# Patient Record
Sex: FEMALE | Race: WHITE | NOT HISPANIC OR LATINO
[De-identification: names, ages, dates, MRNs, and addresses within clinical notes are randomized per-mention and may not be internally consistent; named-entity substitution may affect disease eponyms.]

---

## 2017-03-03 PROBLEM — Z00.00 ENCOUNTER FOR PREVENTIVE HEALTH EXAMINATION: Status: ACTIVE | Noted: 2017-03-03

## 2017-03-07 ENCOUNTER — APPOINTMENT (OUTPATIENT)
Dept: CARDIOLOGY | Facility: CLINIC | Age: 77
End: 2017-03-07

## 2017-05-04 ENCOUNTER — APPOINTMENT (OUTPATIENT)
Dept: CARDIOLOGY | Facility: CLINIC | Age: 77
End: 2017-05-04

## 2017-06-06 ENCOUNTER — APPOINTMENT (OUTPATIENT)
Dept: ELECTROPHYSIOLOGY | Facility: CLINIC | Age: 77
End: 2017-06-06

## 2017-09-14 ENCOUNTER — APPOINTMENT (OUTPATIENT)
Dept: ELECTROPHYSIOLOGY | Facility: CLINIC | Age: 77
End: 2017-09-14

## 2017-11-09 ENCOUNTER — APPOINTMENT (OUTPATIENT)
Dept: ELECTROPHYSIOLOGY | Facility: CLINIC | Age: 77
End: 2017-11-09
Payer: MEDICARE

## 2017-11-09 VITALS
DIASTOLIC BLOOD PRESSURE: 73 MMHG | WEIGHT: 164 LBS | HEART RATE: 69 BPM | BODY MASS INDEX: 25.74 KG/M2 | HEIGHT: 67 IN | SYSTOLIC BLOOD PRESSURE: 118 MMHG

## 2017-11-09 PROCEDURE — 93280 PM DEVICE PROGR EVAL DUAL: CPT

## 2018-02-13 ENCOUNTER — APPOINTMENT (OUTPATIENT)
Dept: ELECTROPHYSIOLOGY | Facility: CLINIC | Age: 78
End: 2018-02-13
Payer: MEDICARE

## 2018-02-13 PROCEDURE — 93294 REM INTERROG EVL PM/LDLS PM: CPT

## 2018-02-13 PROCEDURE — 93296 REM INTERROG EVL PM/IDS: CPT

## 2018-05-15 ENCOUNTER — APPOINTMENT (OUTPATIENT)
Dept: ELECTROPHYSIOLOGY | Facility: CLINIC | Age: 78
End: 2018-05-15
Payer: MEDICARE

## 2018-05-15 PROCEDURE — 93294 REM INTERROG EVL PM/LDLS PM: CPT

## 2018-05-15 PROCEDURE — 93296 REM INTERROG EVL PM/IDS: CPT

## 2018-05-18 ENCOUNTER — TRANSCRIPTION ENCOUNTER (OUTPATIENT)
Age: 78
End: 2018-05-18

## 2018-08-02 ENCOUNTER — APPOINTMENT (OUTPATIENT)
Dept: CARDIOLOGY | Facility: CLINIC | Age: 78
End: 2018-08-02
Payer: MEDICARE

## 2018-08-02 PROCEDURE — 36416 COLLJ CAPILLARY BLOOD SPEC: CPT

## 2018-08-02 PROCEDURE — 85610 PROTHROMBIN TIME: CPT | Mod: QW

## 2018-08-02 PROCEDURE — 99213 OFFICE O/P EST LOW 20 MIN: CPT

## 2018-08-22 ENCOUNTER — APPOINTMENT (OUTPATIENT)
Dept: CARDIOLOGY | Facility: CLINIC | Age: 78
End: 2018-08-22
Payer: MEDICARE

## 2018-08-22 PROCEDURE — 93793 ANTICOAG MGMT PT WARFARIN: CPT

## 2018-08-22 PROCEDURE — 85610 PROTHROMBIN TIME: CPT | Mod: QW

## 2018-08-28 ENCOUNTER — APPOINTMENT (OUTPATIENT)
Dept: ELECTROPHYSIOLOGY | Facility: CLINIC | Age: 78
End: 2018-08-28
Payer: MEDICARE

## 2018-08-28 PROCEDURE — 93296 REM INTERROG EVL PM/IDS: CPT

## 2018-08-28 PROCEDURE — 93294 REM INTERROG EVL PM/LDLS PM: CPT

## 2018-08-29 ENCOUNTER — APPOINTMENT (OUTPATIENT)
Dept: CARDIOLOGY | Facility: CLINIC | Age: 78
End: 2018-08-29
Payer: MEDICARE

## 2018-08-29 PROCEDURE — 93793 ANTICOAG MGMT PT WARFARIN: CPT

## 2018-08-29 PROCEDURE — 85610 PROTHROMBIN TIME: CPT | Mod: QW

## 2018-09-12 ENCOUNTER — APPOINTMENT (OUTPATIENT)
Dept: CARDIOLOGY | Facility: CLINIC | Age: 78
End: 2018-09-12
Payer: MEDICARE

## 2018-09-12 PROCEDURE — 85610 PROTHROMBIN TIME: CPT | Mod: QW

## 2018-09-12 PROCEDURE — 93793 ANTICOAG MGMT PT WARFARIN: CPT

## 2018-09-19 ENCOUNTER — APPOINTMENT (OUTPATIENT)
Dept: CARDIOLOGY | Facility: CLINIC | Age: 78
End: 2018-09-19
Payer: MEDICARE

## 2018-09-19 PROCEDURE — 85610 PROTHROMBIN TIME: CPT | Mod: QW

## 2018-09-19 PROCEDURE — 93793 ANTICOAG MGMT PT WARFARIN: CPT

## 2018-10-04 ENCOUNTER — APPOINTMENT (OUTPATIENT)
Dept: CARDIOLOGY | Facility: CLINIC | Age: 78
End: 2018-10-04
Payer: MEDICARE

## 2018-10-04 PROCEDURE — 85610 PROTHROMBIN TIME: CPT | Mod: QW

## 2018-10-04 PROCEDURE — 36416 COLLJ CAPILLARY BLOOD SPEC: CPT

## 2018-10-04 PROCEDURE — 99213 OFFICE O/P EST LOW 20 MIN: CPT

## 2018-10-08 ENCOUNTER — APPOINTMENT (OUTPATIENT)
Dept: CARDIOLOGY | Facility: CLINIC | Age: 78
End: 2018-10-08
Payer: MEDICARE

## 2018-10-08 PROCEDURE — 85610 PROTHROMBIN TIME: CPT | Mod: QW

## 2018-10-08 PROCEDURE — 36416 COLLJ CAPILLARY BLOOD SPEC: CPT

## 2018-11-15 ENCOUNTER — APPOINTMENT (OUTPATIENT)
Dept: ELECTROPHYSIOLOGY | Facility: CLINIC | Age: 78
End: 2018-11-15
Payer: MEDICARE

## 2018-11-15 VITALS
HEIGHT: 67 IN | DIASTOLIC BLOOD PRESSURE: 74 MMHG | SYSTOLIC BLOOD PRESSURE: 120 MMHG | BODY MASS INDEX: 23.86 KG/M2 | HEART RATE: 64 BPM | WEIGHT: 152 LBS

## 2018-11-15 PROCEDURE — 93280 PM DEVICE PROGR EVAL DUAL: CPT

## 2018-12-12 ENCOUNTER — APPOINTMENT (OUTPATIENT)
Dept: CARDIOLOGY | Facility: CLINIC | Age: 78
End: 2018-12-12
Payer: MEDICARE

## 2018-12-12 PROCEDURE — 85610 PROTHROMBIN TIME: CPT | Mod: QW

## 2018-12-12 PROCEDURE — 93793 ANTICOAG MGMT PT WARFARIN: CPT

## 2018-12-26 ENCOUNTER — APPOINTMENT (OUTPATIENT)
Dept: CARDIOLOGY | Facility: CLINIC | Age: 78
End: 2018-12-26
Payer: MEDICARE

## 2018-12-26 PROCEDURE — 93793 ANTICOAG MGMT PT WARFARIN: CPT

## 2018-12-26 PROCEDURE — 85610 PROTHROMBIN TIME: CPT | Mod: QW

## 2019-01-02 ENCOUNTER — APPOINTMENT (OUTPATIENT)
Dept: CARDIOLOGY | Facility: CLINIC | Age: 79
End: 2019-01-02
Payer: MEDICARE

## 2019-01-02 PROCEDURE — 85610 PROTHROMBIN TIME: CPT | Mod: QW

## 2019-01-02 PROCEDURE — 93793 ANTICOAG MGMT PT WARFARIN: CPT

## 2019-02-26 ENCOUNTER — APPOINTMENT (OUTPATIENT)
Dept: ELECTROPHYSIOLOGY | Facility: CLINIC | Age: 79
End: 2019-02-26
Payer: MEDICARE

## 2019-02-26 PROCEDURE — 93294 REM INTERROG EVL PM/LDLS PM: CPT

## 2019-02-26 PROCEDURE — 93296 REM INTERROG EVL PM/IDS: CPT

## 2019-02-27 ENCOUNTER — APPOINTMENT (OUTPATIENT)
Dept: CARDIOLOGY | Facility: CLINIC | Age: 79
End: 2019-02-27
Payer: MEDICARE

## 2019-02-27 PROCEDURE — 85610 PROTHROMBIN TIME: CPT | Mod: QW

## 2019-02-27 PROCEDURE — 93793 ANTICOAG MGMT PT WARFARIN: CPT

## 2019-03-19 ENCOUNTER — RX RENEWAL (OUTPATIENT)
Age: 79
End: 2019-03-19

## 2019-04-03 ENCOUNTER — APPOINTMENT (OUTPATIENT)
Dept: CARDIOLOGY | Facility: CLINIC | Age: 79
End: 2019-04-03
Payer: MEDICARE

## 2019-04-03 PROCEDURE — 93793 ANTICOAG MGMT PT WARFARIN: CPT

## 2019-04-03 PROCEDURE — 85610 PROTHROMBIN TIME: CPT | Mod: QW

## 2019-05-15 ENCOUNTER — APPOINTMENT (OUTPATIENT)
Dept: CARDIOLOGY | Facility: CLINIC | Age: 79
End: 2019-05-15
Payer: MEDICARE

## 2019-05-15 PROCEDURE — 93793 ANTICOAG MGMT PT WARFARIN: CPT

## 2019-05-15 PROCEDURE — 85610 PROTHROMBIN TIME: CPT | Mod: QW

## 2019-05-28 ENCOUNTER — APPOINTMENT (OUTPATIENT)
Dept: ELECTROPHYSIOLOGY | Facility: CLINIC | Age: 79
End: 2019-05-28

## 2019-06-12 ENCOUNTER — RX RENEWAL (OUTPATIENT)
Age: 79
End: 2019-06-12

## 2019-06-13 ENCOUNTER — APPOINTMENT (OUTPATIENT)
Dept: CARDIOLOGY | Facility: CLINIC | Age: 79
End: 2019-06-13
Payer: MEDICARE

## 2019-06-13 PROCEDURE — 93793 ANTICOAG MGMT PT WARFARIN: CPT

## 2019-06-13 PROCEDURE — 85610 PROTHROMBIN TIME: CPT | Mod: QW

## 2019-09-03 ENCOUNTER — APPOINTMENT (OUTPATIENT)
Dept: ELECTROPHYSIOLOGY | Facility: CLINIC | Age: 79
End: 2019-09-03
Payer: MEDICARE

## 2019-09-03 PROCEDURE — 93294 REM INTERROG EVL PM/LDLS PM: CPT

## 2019-09-03 PROCEDURE — 93296 REM INTERROG EVL PM/IDS: CPT

## 2019-11-06 LAB — INR PPP: 2.3 RATIO

## 2019-11-13 ENCOUNTER — RESULT REVIEW (OUTPATIENT)
Age: 79
End: 2019-11-13

## 2019-11-14 ENCOUNTER — APPOINTMENT (OUTPATIENT)
Dept: ELECTROPHYSIOLOGY | Facility: CLINIC | Age: 79
End: 2019-11-14
Payer: MEDICARE

## 2019-11-14 VITALS
BODY MASS INDEX: 24.33 KG/M2 | DIASTOLIC BLOOD PRESSURE: 81 MMHG | OXYGEN SATURATION: 99 % | SYSTOLIC BLOOD PRESSURE: 129 MMHG | WEIGHT: 155 LBS | HEIGHT: 67 IN | HEART RATE: 79 BPM

## 2019-11-14 LAB
INR PPP: 2.8 RATIO
QUALITY CONTROL: YES

## 2019-11-14 PROCEDURE — 93280 PM DEVICE PROGR EVAL DUAL: CPT

## 2019-11-14 RX ORDER — DIGOXIN 125 UG/1
125 TABLET ORAL DAILY
Refills: 0 | Status: ACTIVE | COMMUNITY

## 2019-11-14 RX ORDER — WARFARIN SODIUM 5 MG/1
5 TABLET ORAL
Refills: 0 | Status: DISCONTINUED | COMMUNITY
End: 2019-11-14

## 2019-11-14 RX ORDER — ATORVASTATIN CALCIUM 20 MG/1
20 TABLET, FILM COATED ORAL DAILY
Refills: 0 | Status: ACTIVE | COMMUNITY

## 2019-11-14 RX ORDER — FUROSEMIDE 20 MG/1
20 TABLET ORAL
Refills: 0 | Status: ACTIVE | COMMUNITY

## 2019-11-14 RX ORDER — VERAPAMIL HYDROCHLORIDE 120 MG/1
120 CAPSULE, EXTENDED RELEASE ORAL DAILY
Refills: 0 | Status: ACTIVE | COMMUNITY

## 2019-11-14 RX ORDER — SPIRONOLACTONE 25 MG/1
25 TABLET ORAL DAILY
Refills: 0 | Status: ACTIVE | COMMUNITY

## 2019-11-20 ENCOUNTER — APPOINTMENT (OUTPATIENT)
Dept: CARDIOLOGY | Facility: CLINIC | Age: 79
End: 2019-11-20
Payer: MEDICARE

## 2019-11-20 VITALS — RESPIRATION RATE: 12 BRPM | HEART RATE: 87 BPM | SYSTOLIC BLOOD PRESSURE: 133 MMHG | DIASTOLIC BLOOD PRESSURE: 85 MMHG

## 2019-11-20 PROCEDURE — 85610 PROTHROMBIN TIME: CPT | Mod: QW

## 2019-11-20 PROCEDURE — 93793 ANTICOAG MGMT PT WARFARIN: CPT

## 2019-11-25 ENCOUNTER — RX RENEWAL (OUTPATIENT)
Age: 79
End: 2019-11-25

## 2019-11-25 RX ORDER — WARFARIN 5 MG/1
5 TABLET ORAL
Qty: 90 | Refills: 3 | Status: ACTIVE | COMMUNITY
Start: 2019-11-25 | End: 1900-01-01

## 2019-11-27 ENCOUNTER — RESULT REVIEW (OUTPATIENT)
Age: 79
End: 2019-11-27

## 2019-12-04 ENCOUNTER — APPOINTMENT (OUTPATIENT)
Dept: CARDIOLOGY | Facility: CLINIC | Age: 79
End: 2019-12-04

## 2019-12-04 LAB
INR PPP: 2.6 RATIO
QUALITY CONTROL: YES

## 2019-12-06 LAB
INR PPP: 0 RATIO
INR PPP: 2.4 RATIO

## 2019-12-11 LAB
INR PPP: 3 RATIO
POCT-PROTHROMBIN TIME: 35.5 SECS

## 2019-12-12 ENCOUNTER — APPOINTMENT (OUTPATIENT)
Dept: CARDIOLOGY | Facility: CLINIC | Age: 79
End: 2019-12-12
Payer: MEDICARE

## 2019-12-12 PROCEDURE — 93793 ANTICOAG MGMT PT WARFARIN: CPT

## 2019-12-12 PROCEDURE — 85610 PROTHROMBIN TIME: CPT | Mod: QW

## 2019-12-18 ENCOUNTER — RESULT REVIEW (OUTPATIENT)
Age: 79
End: 2019-12-18

## 2019-12-24 ENCOUNTER — RESULT REVIEW (OUTPATIENT)
Age: 79
End: 2019-12-24

## 2019-12-24 LAB
INR PPP: 2.7 RATIO
QUALITY CONTROL: YES

## 2020-01-08 ENCOUNTER — RESULT REVIEW (OUTPATIENT)
Age: 80
End: 2020-01-08

## 2020-01-08 LAB
INR PPP: 2.4 RATIO
QUALITY CONTROL: YES

## 2020-01-09 LAB — INR PPP: 2.4 RATIO

## 2020-01-23 LAB
INR PPP: 2.4 RATIO
QUALITY CONTROL: YES

## 2020-01-25 ENCOUNTER — APPOINTMENT (OUTPATIENT)
Dept: CARDIOLOGY | Facility: CLINIC | Age: 80
End: 2020-01-25
Payer: MEDICARE

## 2020-01-25 PROCEDURE — 85610 PROTHROMBIN TIME: CPT | Mod: QW

## 2020-01-25 PROCEDURE — 93793 ANTICOAG MGMT PT WARFARIN: CPT

## 2020-01-29 ENCOUNTER — APPOINTMENT (OUTPATIENT)
Dept: CARDIOLOGY | Facility: CLINIC | Age: 80
End: 2020-01-29
Payer: MEDICARE

## 2020-01-29 LAB
INR PPP: 1.8 RATIO
INR PPP: 2 RATIO

## 2020-01-29 PROCEDURE — 85610 PROTHROMBIN TIME: CPT | Mod: QW

## 2020-01-29 PROCEDURE — 93793 ANTICOAG MGMT PT WARFARIN: CPT

## 2020-01-31 LAB
INR PPP: 2.1 RATIO
QUALITY CONTROL: YES

## 2020-02-05 ENCOUNTER — APPOINTMENT (OUTPATIENT)
Dept: CARDIOLOGY | Facility: CLINIC | Age: 80
End: 2020-02-05
Payer: MEDICARE

## 2020-02-05 PROCEDURE — 93793 ANTICOAG MGMT PT WARFARIN: CPT

## 2020-02-05 PROCEDURE — 85610 PROTHROMBIN TIME: CPT | Mod: QW

## 2020-02-11 ENCOUNTER — APPOINTMENT (OUTPATIENT)
Dept: ELECTROPHYSIOLOGY | Facility: CLINIC | Age: 80
End: 2020-02-11
Payer: MEDICARE

## 2020-02-11 LAB
INR PPP: 2.6 RATIO
QUALITY CONTROL: YES

## 2020-02-11 PROCEDURE — 93294 REM INTERROG EVL PM/LDLS PM: CPT

## 2020-02-11 PROCEDURE — 93296 REM INTERROG EVL PM/IDS: CPT

## 2020-02-19 ENCOUNTER — APPOINTMENT (OUTPATIENT)
Dept: CARDIOLOGY | Facility: CLINIC | Age: 80
End: 2020-02-19
Payer: MEDICARE

## 2020-02-19 PROCEDURE — 85610 PROTHROMBIN TIME: CPT | Mod: QW

## 2020-02-19 PROCEDURE — 93793 ANTICOAG MGMT PT WARFARIN: CPT

## 2020-02-20 LAB
INR PPP: 3.3 RATIO
QUALITY CONTROL: YES

## 2020-02-26 ENCOUNTER — APPOINTMENT (OUTPATIENT)
Dept: CARDIOLOGY | Facility: CLINIC | Age: 80
End: 2020-02-26
Payer: MEDICARE

## 2020-02-26 LAB
INR PPP: 2.5 RATIO
QUALITY CONTROL: YES

## 2020-02-26 PROCEDURE — 93793 ANTICOAG MGMT PT WARFARIN: CPT

## 2020-02-26 PROCEDURE — 85610 PROTHROMBIN TIME: CPT | Mod: QW

## 2020-02-27 LAB
INR PPP: 3.5 RATIO
QUALITY CONTROL: YES

## 2020-03-04 ENCOUNTER — APPOINTMENT (OUTPATIENT)
Dept: CARDIOLOGY | Facility: CLINIC | Age: 80
End: 2020-03-04
Payer: MEDICARE

## 2020-03-04 PROCEDURE — 93793 ANTICOAG MGMT PT WARFARIN: CPT

## 2020-03-04 PROCEDURE — 36416 COLLJ CAPILLARY BLOOD SPEC: CPT

## 2020-03-04 PROCEDURE — 85610 PROTHROMBIN TIME: CPT | Mod: QW

## 2020-03-09 ENCOUNTER — APPOINTMENT (OUTPATIENT)
Dept: CARDIOLOGY | Facility: CLINIC | Age: 80
End: 2020-03-09
Payer: MEDICARE

## 2020-03-09 PROCEDURE — 93793 ANTICOAG MGMT PT WARFARIN: CPT

## 2020-03-09 PROCEDURE — 85610 PROTHROMBIN TIME: CPT | Mod: QW

## 2020-03-11 ENCOUNTER — APPOINTMENT (OUTPATIENT)
Dept: CARDIOLOGY | Facility: CLINIC | Age: 80
End: 2020-03-11
Payer: MEDICARE

## 2020-03-11 LAB
INR PPP: 3.4 RATIO
QUALITY CONTROL: YES

## 2020-03-11 PROCEDURE — 93793 ANTICOAG MGMT PT WARFARIN: CPT

## 2020-03-16 LAB
INR PPP: 1.4 RATIO
QUALITY CONTROL: YES

## 2020-04-01 ENCOUNTER — APPOINTMENT (OUTPATIENT)
Dept: CARDIOLOGY | Facility: CLINIC | Age: 80
End: 2020-04-01
Payer: MEDICARE

## 2020-04-01 PROCEDURE — 93793 ANTICOAG MGMT PT WARFARIN: CPT

## 2020-04-01 PROCEDURE — 85610 PROTHROMBIN TIME: CPT | Mod: QW

## 2020-04-02 LAB
INR PPP: 2.5 RATIO
INR PPP: 2.6 RATIO
QUALITY CONTROL: YES
QUALITY CONTROL: YES

## 2020-04-23 LAB
INR PPP: 2.9 RATIO
INR PPP: 3 RATIO
QUALITY CONTROL: YES
QUALITY CONTROL: YES

## 2020-05-06 ENCOUNTER — APPOINTMENT (OUTPATIENT)
Dept: CARDIOLOGY | Facility: CLINIC | Age: 80
End: 2020-05-06
Payer: MEDICARE

## 2020-05-06 LAB — INR PPP: 3.3 RATIO

## 2020-05-06 PROCEDURE — 93793 ANTICOAG MGMT PT WARFARIN: CPT

## 2020-05-06 PROCEDURE — 85610 PROTHROMBIN TIME: CPT | Mod: QW

## 2020-05-08 LAB
INR PPP: 2.7 RATIO
INR PPP: 2.8 RATIO
INR PPP: 2.9 RATIO
QUALITY CONTROL: YES

## 2020-05-11 LAB
INR PPP: 2.7 RATIO
QUALITY CONTROL: YES

## 2020-05-18 ENCOUNTER — APPOINTMENT (OUTPATIENT)
Dept: ELECTROPHYSIOLOGY | Facility: CLINIC | Age: 80
End: 2020-05-18
Payer: MEDICARE

## 2020-05-18 PROCEDURE — 93296 REM INTERROG EVL PM/IDS: CPT

## 2020-05-18 PROCEDURE — 93294 REM INTERROG EVL PM/LDLS PM: CPT

## 2020-05-20 LAB
INR PPP: 2.5 RATIO
QUALITY CONTROL: YES

## 2020-05-22 LAB
INR PPP: 2.4 RATIO
QUALITY CONTROL: YES

## 2020-06-03 ENCOUNTER — APPOINTMENT (OUTPATIENT)
Dept: CARDIOLOGY | Facility: CLINIC | Age: 80
End: 2020-06-03
Payer: MEDICARE

## 2020-06-03 PROCEDURE — 85610 PROTHROMBIN TIME: CPT | Mod: QW

## 2020-06-03 PROCEDURE — 93793 ANTICOAG MGMT PT WARFARIN: CPT

## 2020-06-10 LAB
INR PPP: 2.5 RATIO
INR PPP: 2.5 RATIO
QUALITY CONTROL: YES
QUALITY CONTROL: YES

## 2020-06-18 LAB
INR PPP: 2.8 RATIO
QUALITY CONTROL: YES

## 2020-06-30 LAB
INR PPP: 2.3 RATIO
INR PPP: 2.5 RATIO
QUALITY CONTROL: YES
QUALITY CONTROL: YES

## 2020-07-01 ENCOUNTER — APPOINTMENT (OUTPATIENT)
Dept: CARDIOLOGY | Facility: CLINIC | Age: 80
End: 2020-07-01
Payer: MEDICARE

## 2020-07-01 PROCEDURE — 93793 ANTICOAG MGMT PT WARFARIN: CPT

## 2020-07-01 PROCEDURE — 85610 PROTHROMBIN TIME: CPT | Mod: QW

## 2020-07-01 PROCEDURE — 36416 COLLJ CAPILLARY BLOOD SPEC: CPT

## 2020-07-13 LAB
INR PPP: 2.6 RATIO
QUALITY CONTROL: YES

## 2020-07-23 LAB
INR PPP: 2.9 RATIO
INR PPP: 3 RATIO
QUALITY CONTROL: YES
QUALITY CONTROL: YES

## 2020-07-29 LAB
INR PPP: 3.4 RATIO
QUALITY CONTROL: YES

## 2020-08-06 ENCOUNTER — APPOINTMENT (OUTPATIENT)
Dept: CARDIOLOGY | Facility: CLINIC | Age: 80
End: 2020-08-06
Payer: MEDICARE

## 2020-08-06 LAB
INR PPP: 2 RATIO
QUALITY CONTROL: YES

## 2020-08-06 PROCEDURE — 85610 PROTHROMBIN TIME: CPT | Mod: QW

## 2020-08-06 PROCEDURE — 93793 ANTICOAG MGMT PT WARFARIN: CPT

## 2020-08-07 LAB
INR PPP: 3 RATIO
QUALITY CONTROL: YES

## 2020-08-14 LAB
INR PPP: 2.7 RATIO
QUALITY CONTROL: YES

## 2020-08-20 LAB
INR PPP: 2.9 RATIO
QUALITY CONTROL: YES

## 2020-08-24 ENCOUNTER — APPOINTMENT (OUTPATIENT)
Dept: ELECTROPHYSIOLOGY | Facility: CLINIC | Age: 80
End: 2020-08-24
Payer: MEDICARE

## 2020-08-25 PROCEDURE — 93294 REM INTERROG EVL PM/LDLS PM: CPT

## 2020-08-25 PROCEDURE — 93296 REM INTERROG EVL PM/IDS: CPT

## 2020-08-27 LAB
INR PPP: 2.4 RATIO
QUALITY CONTROL: YES

## 2020-09-03 ENCOUNTER — APPOINTMENT (OUTPATIENT)
Dept: CARDIOLOGY | Facility: CLINIC | Age: 80
End: 2020-09-03
Payer: MEDICARE

## 2020-09-03 PROCEDURE — 85610 PROTHROMBIN TIME: CPT | Mod: QW

## 2020-09-03 PROCEDURE — 93793 ANTICOAG MGMT PT WARFARIN: CPT

## 2020-09-04 LAB
INR PPP: 2.8 RATIO
QUALITY CONTROL: YES

## 2020-09-10 ENCOUNTER — APPOINTMENT (OUTPATIENT)
Dept: CARDIOLOGY | Facility: CLINIC | Age: 80
End: 2020-09-10
Payer: MEDICARE

## 2020-09-10 DIAGNOSIS — R79.1 ABNORMAL COAGULATION PROFILE: ICD-10-CM

## 2020-09-10 PROCEDURE — 85610 PROTHROMBIN TIME: CPT | Mod: QW

## 2020-09-10 PROCEDURE — 93793 ANTICOAG MGMT PT WARFARIN: CPT

## 2020-09-16 LAB
INR PPP: 2.7 RATIO
QUALITY CONTROL: YES

## 2020-09-21 LAB
INR PPP: 3.9 RATIO
QUALITY CONTROL: YES

## 2020-09-28 LAB
INR PPP: 2.7 RATIO
QUALITY CONTROL: YES

## 2020-09-30 ENCOUNTER — APPOINTMENT (OUTPATIENT)
Dept: CARDIOLOGY | Facility: CLINIC | Age: 80
End: 2020-09-30
Payer: MEDICARE

## 2020-09-30 VITALS — SYSTOLIC BLOOD PRESSURE: 132 MMHG | RESPIRATION RATE: 18 BRPM | HEART RATE: 81 BPM | DIASTOLIC BLOOD PRESSURE: 76 MMHG

## 2020-09-30 PROCEDURE — 93793 ANTICOAG MGMT PT WARFARIN: CPT

## 2020-09-30 PROCEDURE — 85610 PROTHROMBIN TIME: CPT | Mod: QW

## 2020-09-30 PROCEDURE — 36416 COLLJ CAPILLARY BLOOD SPEC: CPT

## 2020-10-07 ENCOUNTER — APPOINTMENT (OUTPATIENT)
Dept: CARDIOLOGY | Facility: CLINIC | Age: 80
End: 2020-10-07
Payer: MEDICARE

## 2020-10-07 PROCEDURE — 99441: CPT | Mod: 95

## 2020-10-09 LAB
INR PPP: 3 RATIO
POCT-PROTHROMBIN TIME: 36.3 SECS
QUALITY CONTROL: YES

## 2020-10-20 LAB
INR PPP: 2.9 RATIO
QUALITY CONTROL: YES

## 2020-10-21 LAB
INR PPP: 2.7 RATIO
QUALITY CONTROL: YES

## 2020-10-22 LAB
INR PPP: 3.5 RATIO
QUALITY CONTROL: YES

## 2020-10-30 LAB
INR PPP: 2.7 RATIO
QUALITY CONTROL: YES

## 2020-11-04 ENCOUNTER — APPOINTMENT (OUTPATIENT)
Dept: CARDIOLOGY | Facility: CLINIC | Age: 80
End: 2020-11-04
Payer: MEDICARE

## 2020-11-04 PROCEDURE — 93793 ANTICOAG MGMT PT WARFARIN: CPT

## 2020-11-04 PROCEDURE — 85610 PROTHROMBIN TIME: CPT | Mod: QW

## 2020-11-05 LAB
INR PPP: 2.6 RATIO
QUALITY CONTROL: YES

## 2020-11-11 LAB
INR PPP: 3.1 RATIO
QUALITY CONTROL: YES

## 2020-11-18 LAB
INR PPP: 2.9 RATIO
QUALITY CONTROL: YES

## 2020-11-19 ENCOUNTER — APPOINTMENT (OUTPATIENT)
Dept: ELECTROPHYSIOLOGY | Facility: CLINIC | Age: 80
End: 2020-11-19
Payer: MEDICARE

## 2020-11-19 VITALS
HEIGHT: 67 IN | HEART RATE: 81 BPM | OXYGEN SATURATION: 100 % | BODY MASS INDEX: 23.23 KG/M2 | WEIGHT: 148 LBS | DIASTOLIC BLOOD PRESSURE: 62 MMHG | SYSTOLIC BLOOD PRESSURE: 130 MMHG

## 2020-11-19 DIAGNOSIS — I47.2 VENTRICULAR TACHYCARDIA: ICD-10-CM

## 2020-11-19 DIAGNOSIS — I49.5 SICK SINUS SYNDROME: ICD-10-CM

## 2020-11-19 PROCEDURE — 93280 PM DEVICE PROGR EVAL DUAL: CPT

## 2020-11-19 RX ORDER — IRON HEME POLYPEPTIDE/FOLIC AC 12-1MG
125 MCG TABLET ORAL
Refills: 0 | Status: DISCONTINUED | COMMUNITY
End: 2020-11-19

## 2020-11-20 ENCOUNTER — LABORATORY RESULT (OUTPATIENT)
Age: 80
End: 2020-11-20

## 2020-11-25 ENCOUNTER — APPOINTMENT (OUTPATIENT)
Dept: CARDIOLOGY | Facility: CLINIC | Age: 80
End: 2020-11-25
Payer: MEDICARE

## 2020-11-25 PROCEDURE — 99441: CPT | Mod: 95

## 2020-12-02 ENCOUNTER — APPOINTMENT (OUTPATIENT)
Dept: CARDIOLOGY | Facility: CLINIC | Age: 80
End: 2020-12-02
Payer: MEDICARE

## 2020-12-02 PROCEDURE — 99441: CPT | Mod: 95

## 2020-12-03 LAB
INR PPP: 2.4 RATIO
QUALITY CONTROL: YES

## 2020-12-14 LAB — INR PPP: 3.4 RATIO

## 2020-12-21 LAB
INR PPP: 2.6 RATIO
QUALITY CONTROL: YES

## 2020-12-29 LAB
INR PPP: 2.8 RATIO
QUALITY CONTROL: YES

## 2020-12-30 LAB
INR PPP: 3 RATIO
QUALITY CONTROL: YES

## 2021-01-06 ENCOUNTER — APPOINTMENT (OUTPATIENT)
Dept: CARDIOLOGY | Facility: CLINIC | Age: 81
End: 2021-01-06
Payer: MEDICARE

## 2021-01-06 PROCEDURE — 99441: CPT | Mod: 95

## 2021-01-07 LAB
INR PPP: 2.2 RATIO
QUALITY CONTROL: YES

## 2021-01-20 LAB
INR PPP: 2.5 RATIO
QUALITY CONTROL: YES

## 2021-01-21 LAB
INR PPP: 2.5 RATIO
QUALITY CONTROL: YES

## 2021-01-27 LAB
INR PPP: 2.9 RATIO
QUALITY CONTROL: YES

## 2021-02-03 ENCOUNTER — APPOINTMENT (OUTPATIENT)
Dept: CARDIOLOGY | Facility: CLINIC | Age: 81
End: 2021-02-03
Payer: MEDICARE

## 2021-02-03 DIAGNOSIS — M10.9 GOUT, UNSPECIFIED: ICD-10-CM

## 2021-02-03 PROCEDURE — 99441: CPT | Mod: 95

## 2021-02-11 LAB
INR PPP: 3.1 RATIO
QUALITY CONTROL: YES

## 2021-02-17 LAB
INR PPP: 2.5 RATIO
INR PPP: 2.6 RATIO
INR PPP: 2.6 RATIO
QUALITY CONTROL: YES

## 2021-02-24 ENCOUNTER — APPOINTMENT (OUTPATIENT)
Dept: ELECTROPHYSIOLOGY | Facility: CLINIC | Age: 81
End: 2021-02-24
Payer: MEDICARE

## 2021-02-24 LAB
INR PPP: 2.1 RATIO
QUALITY CONTROL: YES

## 2021-02-25 PROCEDURE — 93294 REM INTERROG EVL PM/LDLS PM: CPT

## 2021-02-25 PROCEDURE — 93296 REM INTERROG EVL PM/IDS: CPT

## 2021-02-26 ENCOUNTER — NON-APPOINTMENT (OUTPATIENT)
Age: 81
End: 2021-02-26

## 2021-03-03 ENCOUNTER — APPOINTMENT (OUTPATIENT)
Dept: CARDIOLOGY | Facility: CLINIC | Age: 81
End: 2021-03-03
Payer: MEDICARE

## 2021-03-03 LAB
INR PPP: 2.5 RATIO
QUALITY CONTROL: YES

## 2021-03-03 PROCEDURE — 99441: CPT | Mod: 95

## 2021-03-10 ENCOUNTER — APPOINTMENT (OUTPATIENT)
Dept: CARDIOLOGY | Facility: CLINIC | Age: 81
End: 2021-03-10

## 2021-03-11 LAB
INR PPP: 2.1 RATIO
QUALITY CONTROL: YES

## 2021-03-17 LAB
INR PPP: 2.3 RATIO
QUALITY CONTROL: YES

## 2021-03-23 LAB
INR PPP: 2.3 RATIO
QUALITY CONTROL: YES

## 2021-03-30 ENCOUNTER — APPOINTMENT (OUTPATIENT)
Dept: CARDIOLOGY | Facility: CLINIC | Age: 81
End: 2021-03-30
Payer: MEDICARE

## 2021-03-30 VITALS — DIASTOLIC BLOOD PRESSURE: 72 MMHG | RESPIRATION RATE: 18 BRPM | SYSTOLIC BLOOD PRESSURE: 124 MMHG | HEART RATE: 78 BPM

## 2021-03-30 LAB
INR PPP: 2.2 RATIO
POCT-PROTHROMBIN TIME: 26.9 SECS
QUALITY CONTROL: YES

## 2021-03-30 PROCEDURE — 99213 OFFICE O/P EST LOW 20 MIN: CPT

## 2021-03-30 PROCEDURE — 85610 PROTHROMBIN TIME: CPT | Mod: QW

## 2021-04-07 ENCOUNTER — APPOINTMENT (OUTPATIENT)
Dept: CARDIOLOGY | Facility: CLINIC | Age: 81
End: 2021-04-07
Payer: MEDICARE

## 2021-04-07 LAB
INR PPP: 2.3 RATIO
QUALITY CONTROL: YES

## 2021-04-07 PROCEDURE — 99441: CPT | Mod: 95

## 2021-04-14 LAB
INR PPP: 2.4 RATIO
QUALITY CONTROL: YES

## 2021-04-21 LAB
INR PPP: 2 RATIO
QUALITY CONTROL: YES

## 2021-05-03 LAB
INR PPP: 2.1 RATIO
QUALITY CONTROL: YES

## 2021-05-03 RX ORDER — WARFARIN 5 MG/1
5 TABLET ORAL
Qty: 180 | Refills: 3 | Status: ACTIVE | COMMUNITY
Start: 2019-03-19 | End: 1900-01-01

## 2021-05-05 ENCOUNTER — APPOINTMENT (OUTPATIENT)
Dept: CARDIOLOGY | Facility: CLINIC | Age: 81
End: 2021-05-05
Payer: MEDICARE

## 2021-05-05 LAB
INR PPP: 2.5 RATIO
QUALITY CONTROL: YES

## 2021-05-05 PROCEDURE — 93793 ANTICOAG MGMT PT WARFARIN: CPT

## 2021-05-13 LAB
INR PPP: 2.1 RATIO
QUALITY CONTROL: YES

## 2021-05-21 LAB
INR PPP: 2.2 RATIO
QUALITY CONTROL: YES

## 2021-05-26 ENCOUNTER — APPOINTMENT (OUTPATIENT)
Dept: ELECTROPHYSIOLOGY | Facility: CLINIC | Age: 81
End: 2021-05-26
Payer: MEDICARE

## 2021-05-27 ENCOUNTER — NON-APPOINTMENT (OUTPATIENT)
Age: 81
End: 2021-05-27

## 2021-05-27 LAB
INR PPP: 2.5 RATIO
QUALITY CONTROL: YES

## 2021-05-27 PROCEDURE — 93296 REM INTERROG EVL PM/IDS: CPT

## 2021-05-27 PROCEDURE — 93294 REM INTERROG EVL PM/LDLS PM: CPT

## 2021-06-02 ENCOUNTER — APPOINTMENT (OUTPATIENT)
Dept: CARDIOLOGY | Facility: CLINIC | Age: 81
End: 2021-06-02
Payer: MEDICARE

## 2021-06-02 LAB
INR PPP: 2.3 RATIO
QUALITY CONTROL: YES

## 2021-06-02 PROCEDURE — 93793 ANTICOAG MGMT PT WARFARIN: CPT

## 2021-06-17 LAB
INR PPP: 2.1 RATIO
INR PPP: 2.3 RATIO
QUALITY CONTROL: YES
QUALITY CONTROL: YES

## 2021-06-23 ENCOUNTER — APPOINTMENT (OUTPATIENT)
Dept: CARDIOLOGY | Facility: CLINIC | Age: 81
End: 2021-06-23
Payer: MEDICARE

## 2021-06-23 LAB
INR PPP: 1.8 RATIO
QUALITY CONTROL: YES

## 2021-06-23 PROCEDURE — 93793 ANTICOAG MGMT PT WARFARIN: CPT

## 2021-06-30 LAB
INR PPP: 3 RATIO
QUALITY CONTROL: YES

## 2021-07-07 ENCOUNTER — APPOINTMENT (OUTPATIENT)
Dept: CARDIOLOGY | Facility: CLINIC | Age: 81
End: 2021-07-07
Payer: MEDICARE

## 2021-07-07 PROCEDURE — 93793 ANTICOAG MGMT PT WARFARIN: CPT

## 2021-07-07 PROCEDURE — 85610 PROTHROMBIN TIME: CPT | Mod: QW

## 2021-07-08 LAB
INR PPP: 2.8 RATIO
QUALITY CONTROL: YES

## 2021-07-20 LAB
INR PPP: 3 RATIO
QUALITY CONTROL: YES

## 2021-07-21 LAB
INR PPP: 2.8 RATIO
QUALITY CONTROL: YES

## 2021-08-03 LAB
INR PPP: 2.9 RATIO
QUALITY CONTROL: YES

## 2021-08-05 ENCOUNTER — APPOINTMENT (OUTPATIENT)
Dept: CARDIOLOGY | Facility: CLINIC | Age: 81
End: 2021-08-05
Payer: MEDICARE

## 2021-08-05 PROCEDURE — 85610 PROTHROMBIN TIME: CPT | Mod: QW

## 2021-08-05 PROCEDURE — 93793 ANTICOAG MGMT PT WARFARIN: CPT

## 2021-08-11 LAB
INR PPP: 2.7 RATIO
QUALITY CONTROL: YES

## 2021-08-18 LAB
INR PPP: 2.4 RATIO
INR PPP: 3.2 RATIO
QUALITY CONTROL: YES
QUALITY CONTROL: YES

## 2021-08-25 LAB
INR PPP: 2.1 RATIO
QUALITY CONTROL: YES

## 2021-08-26 ENCOUNTER — APPOINTMENT (OUTPATIENT)
Dept: ELECTROPHYSIOLOGY | Facility: CLINIC | Age: 81
End: 2021-08-26
Payer: MEDICARE

## 2021-08-26 ENCOUNTER — NON-APPOINTMENT (OUTPATIENT)
Age: 81
End: 2021-08-26

## 2021-08-26 PROCEDURE — 93296 REM INTERROG EVL PM/IDS: CPT

## 2021-08-26 PROCEDURE — 93294 REM INTERROG EVL PM/LDLS PM: CPT

## 2021-09-01 ENCOUNTER — APPOINTMENT (OUTPATIENT)
Dept: CARDIOLOGY | Facility: CLINIC | Age: 81
End: 2021-09-01
Payer: MEDICARE

## 2021-09-01 PROCEDURE — 93793 ANTICOAG MGMT PT WARFARIN: CPT

## 2021-09-01 PROCEDURE — 85610 PROTHROMBIN TIME: CPT | Mod: QW

## 2021-09-02 LAB
INR PPP: 2.3 RATIO
QUALITY CONTROL: YES

## 2021-09-08 LAB
INR PPP: 2.3 RATIO
QUALITY CONTROL: YES

## 2021-09-18 LAB
INR PPP: 2.5 RATIO
QUALITY CONTROL: YES

## 2021-09-22 LAB
INR PPP: 2.7 RATIO
QUALITY CONTROL: YES

## 2021-09-29 ENCOUNTER — APPOINTMENT (OUTPATIENT)
Dept: CARDIOLOGY | Facility: CLINIC | Age: 81
End: 2021-09-29
Payer: MEDICARE

## 2021-09-29 VITALS — SYSTOLIC BLOOD PRESSURE: 126 MMHG | HEART RATE: 84 BPM | RESPIRATION RATE: 18 BRPM | DIASTOLIC BLOOD PRESSURE: 79 MMHG

## 2021-09-29 LAB
INR PPP: 3 RATIO
POCT-PROTHROMBIN TIME: 35.9 SECS
QUALITY CONTROL: YES

## 2021-09-29 PROCEDURE — 85610 PROTHROMBIN TIME: CPT | Mod: QW

## 2021-09-29 PROCEDURE — 93793 ANTICOAG MGMT PT WARFARIN: CPT

## 2021-10-06 ENCOUNTER — APPOINTMENT (OUTPATIENT)
Dept: CARDIOLOGY | Facility: CLINIC | Age: 81
End: 2021-10-06
Payer: MEDICARE

## 2021-10-06 ENCOUNTER — NON-APPOINTMENT (OUTPATIENT)
Age: 81
End: 2021-10-06

## 2021-10-06 PROCEDURE — 93793 ANTICOAG MGMT PT WARFARIN: CPT

## 2021-10-07 LAB
INR PPP: 2.6 RATIO
QUALITY CONTROL: YES

## 2021-10-15 LAB
INR PPP: 2.3 RATIO
QUALITY CONTROL: YES

## 2021-10-20 LAB
INR PPP: 2.2 RATIO
QUALITY CONTROL: YES

## 2021-11-03 ENCOUNTER — APPOINTMENT (OUTPATIENT)
Dept: CARDIOLOGY | Facility: CLINIC | Age: 81
End: 2021-11-03
Payer: MEDICARE

## 2021-11-03 PROCEDURE — 93793 ANTICOAG MGMT PT WARFARIN: CPT

## 2021-11-03 PROCEDURE — 85610 PROTHROMBIN TIME: CPT | Mod: QW

## 2021-11-05 LAB
INR PPP: 2.5 RATIO
QUALITY CONTROL: YES

## 2021-11-09 LAB
INR PPP: 2 RATIO
QUALITY CONTROL: YES

## 2021-11-10 LAB
INR PPP: 2.3 RATIO
QUALITY CONTROL: YES

## 2021-11-17 LAB
INR PPP: 2.2 RATIO
QUALITY CONTROL: YES

## 2021-11-18 ENCOUNTER — NON-APPOINTMENT (OUTPATIENT)
Age: 81
End: 2021-11-18

## 2021-11-18 ENCOUNTER — APPOINTMENT (OUTPATIENT)
Dept: ELECTROPHYSIOLOGY | Facility: CLINIC | Age: 81
End: 2021-11-18
Payer: MEDICARE

## 2021-11-18 VITALS
OXYGEN SATURATION: 100 % | WEIGHT: 147 LBS | HEART RATE: 94 BPM | SYSTOLIC BLOOD PRESSURE: 127 MMHG | DIASTOLIC BLOOD PRESSURE: 80 MMHG | HEIGHT: 67 IN | BODY MASS INDEX: 23.07 KG/M2

## 2021-11-18 DIAGNOSIS — Z95.0 PRESENCE OF CARDIAC PACEMAKER: ICD-10-CM

## 2021-11-18 PROCEDURE — 93280 PM DEVICE PROGR EVAL DUAL: CPT

## 2021-11-26 LAB
INR PPP: 2.5 RATIO
QUALITY CONTROL: YES

## 2021-12-01 ENCOUNTER — APPOINTMENT (OUTPATIENT)
Dept: CARDIOLOGY | Facility: CLINIC | Age: 81
End: 2021-12-01
Payer: MEDICARE

## 2021-12-01 LAB
INR PPP: 2.5 RATIO
QUALITY CONTROL: YES

## 2021-12-01 PROCEDURE — 93793 ANTICOAG MGMT PT WARFARIN: CPT

## 2021-12-09 LAB
INR PPP: 2.7 RATIO
QUALITY CONTROL: YES

## 2021-12-15 LAB
INR PPP: 2.6 RATIO
QUALITY CONTROL: YES

## 2021-12-22 LAB
INR PPP: 2.1 RATIO
QUALITY CONTROL: YES

## 2021-12-29 LAB
INR PPP: 2.2 RATIO
QUALITY CONTROL: YES

## 2022-01-05 ENCOUNTER — APPOINTMENT (OUTPATIENT)
Dept: CARDIOLOGY | Facility: CLINIC | Age: 82
End: 2022-01-05
Payer: MEDICARE

## 2022-01-05 LAB
INR PPP: 2.2 RATIO
QUALITY CONTROL: YES

## 2022-01-05 PROCEDURE — 93793 ANTICOAG MGMT PT WARFARIN: CPT

## 2022-01-12 LAB
INR PPP: 2.6 RATIO
QUALITY CONTROL: YES

## 2022-01-19 LAB
INR PPP: 2.2 RATIO
QUALITY CONTROL: YES

## 2022-02-02 ENCOUNTER — APPOINTMENT (OUTPATIENT)
Dept: CARDIOLOGY | Facility: CLINIC | Age: 82
End: 2022-02-02
Payer: MEDICARE

## 2022-02-02 LAB
INR PPP: 2.5 RATIO
INR PPP: 2.6 RATIO
QUALITY CONTROL: YES
QUALITY CONTROL: YES

## 2022-02-02 PROCEDURE — 85610 PROTHROMBIN TIME: CPT | Mod: QW

## 2022-02-02 PROCEDURE — 93793 ANTICOAG MGMT PT WARFARIN: CPT

## 2022-02-14 LAB
INR PPP: 2.7 RATIO
QUALITY CONTROL: YES

## 2022-02-16 ENCOUNTER — APPOINTMENT (OUTPATIENT)
Dept: ELECTROPHYSIOLOGY | Facility: CLINIC | Age: 82
End: 2022-02-16
Payer: MEDICARE

## 2022-02-17 ENCOUNTER — NON-APPOINTMENT (OUTPATIENT)
Age: 82
End: 2022-02-17

## 2022-02-17 PROCEDURE — 93294 REM INTERROG EVL PM/LDLS PM: CPT

## 2022-02-17 PROCEDURE — 93296 REM INTERROG EVL PM/IDS: CPT

## 2022-02-24 LAB
INR PPP: 2.7 RATIO
QUALITY CONTROL: YES

## 2022-02-25 LAB
INR PPP: 2.3 RATIO
QUALITY CONTROL: YES

## 2022-03-02 ENCOUNTER — APPOINTMENT (OUTPATIENT)
Dept: CARDIOLOGY | Facility: CLINIC | Age: 82
End: 2022-03-02
Payer: MEDICARE

## 2022-03-02 PROCEDURE — 93793 ANTICOAG MGMT PT WARFARIN: CPT

## 2022-03-08 LAB
INR PPP: 2.7 RATIO
QUALITY CONTROL: YES

## 2022-03-09 LAB
INR PPP: 2.4 RATIO
QUALITY CONTROL: YES

## 2022-03-23 LAB
INR PPP: 2.4 RATIO
INR PPP: 2.7 RATIO
QUALITY CONTROL: YES
QUALITY CONTROL: YES

## 2022-03-30 LAB
INR PPP: 2.1 RATIO
QUALITY CONTROL: YES

## 2022-04-06 ENCOUNTER — APPOINTMENT (OUTPATIENT)
Dept: CARDIOLOGY | Facility: CLINIC | Age: 82
End: 2022-04-06
Payer: MEDICARE

## 2022-04-06 LAB
INR PPP: 2.1 RATIO
QUALITY CONTROL: YES

## 2022-04-06 PROCEDURE — 93793 ANTICOAG MGMT PT WARFARIN: CPT

## 2022-04-13 ENCOUNTER — APPOINTMENT (OUTPATIENT)
Dept: CARDIOLOGY | Facility: CLINIC | Age: 82
End: 2022-04-13
Payer: MEDICARE

## 2022-04-13 PROCEDURE — 93793 ANTICOAG MGMT PT WARFARIN: CPT

## 2022-04-21 LAB
INR PPP: 2.4 RATIO
QUALITY CONTROL: YES

## 2022-04-27 LAB
INR PPP: 2.4 RATIO
QUALITY CONTROL: YES

## 2022-05-04 ENCOUNTER — APPOINTMENT (OUTPATIENT)
Dept: CARDIOLOGY | Facility: CLINIC | Age: 82
End: 2022-05-04

## 2022-05-05 ENCOUNTER — APPOINTMENT (OUTPATIENT)
Dept: CARDIOLOGY | Facility: CLINIC | Age: 82
End: 2022-05-05
Payer: MEDICARE

## 2022-05-05 PROCEDURE — 93793 ANTICOAG MGMT PT WARFARIN: CPT

## 2022-05-09 ENCOUNTER — FORM ENCOUNTER (OUTPATIENT)
Age: 82
End: 2022-05-09

## 2022-05-09 LAB
INR PPP: 2.4 RATIO
QUALITY CONTROL: YES

## 2022-05-18 ENCOUNTER — APPOINTMENT (OUTPATIENT)
Dept: ELECTROPHYSIOLOGY | Facility: CLINIC | Age: 82
End: 2022-05-18
Payer: MEDICARE

## 2022-05-19 ENCOUNTER — NON-APPOINTMENT (OUTPATIENT)
Age: 82
End: 2022-05-19

## 2022-05-19 ENCOUNTER — APPOINTMENT (OUTPATIENT)
Dept: ELECTROPHYSIOLOGY | Facility: CLINIC | Age: 82
End: 2022-05-19

## 2022-05-19 LAB
INR PPP: 3 RATIO
QUALITY CONTROL: YES

## 2022-05-19 PROCEDURE — 93296 REM INTERROG EVL PM/IDS: CPT

## 2022-05-19 PROCEDURE — 93294 REM INTERROG EVL PM/LDLS PM: CPT

## 2022-05-22 ENCOUNTER — FORM ENCOUNTER (OUTPATIENT)
Age: 82
End: 2022-05-22

## 2022-06-01 LAB
INR PPP: 2.6 RATIO
QUALITY CONTROL: YES

## 2022-07-06 ENCOUNTER — APPOINTMENT (OUTPATIENT)
Dept: CARDIOLOGY | Facility: CLINIC | Age: 82
End: 2022-07-06

## 2022-07-06 LAB
INR PPP: 1.9 RATIO
INR PPP: 2.1 RATIO
INR PPP: 2.5 RATIO
INR PPP: 2.7 RATIO
INR PPP: 2.8 RATIO
QUALITY CONTROL: YES

## 2022-07-06 PROCEDURE — 93793 ANTICOAG MGMT PT WARFARIN: CPT

## 2022-07-13 LAB
INR PPP: 2.2 RATIO
QUALITY CONTROL: YES

## 2022-07-20 LAB
INR PPP: 2.1 RATIO
QUALITY CONTROL: YES

## 2022-08-01 ENCOUNTER — APPOINTMENT (OUTPATIENT)
Dept: CARDIOLOGY | Facility: CLINIC | Age: 82
End: 2022-08-01

## 2022-08-01 LAB
INR PPP: 2.2 RATIO
QUALITY CONTROL: YES

## 2022-08-01 PROCEDURE — 93793 ANTICOAG MGMT PT WARFARIN: CPT

## 2022-08-10 ENCOUNTER — FORM ENCOUNTER (OUTPATIENT)
Age: 82
End: 2022-08-10

## 2022-08-10 LAB
INR PPP: 2.3 RATIO
QUALITY CONTROL: YES

## 2022-08-17 LAB
INR PPP: 2.6 RATIO
QUALITY CONTROL: YES

## 2022-08-18 ENCOUNTER — NON-APPOINTMENT (OUTPATIENT)
Age: 82
End: 2022-08-18

## 2022-08-18 ENCOUNTER — APPOINTMENT (OUTPATIENT)
Dept: ELECTROPHYSIOLOGY | Facility: CLINIC | Age: 82
End: 2022-08-18

## 2022-08-18 PROCEDURE — 93296 REM INTERROG EVL PM/IDS: CPT

## 2022-08-18 PROCEDURE — 93294 REM INTERROG EVL PM/LDLS PM: CPT

## 2022-08-25 LAB
INR PPP: 2.4 RATIO
QUALITY CONTROL: YES

## 2022-09-01 ENCOUNTER — OUTPATIENT (OUTPATIENT)
Dept: OUTPATIENT SERVICES | Facility: HOSPITAL | Age: 82
LOS: 1 days | End: 2022-09-01
Payer: MEDICARE

## 2022-09-01 DIAGNOSIS — Z95.0 PRESENCE OF CARDIAC PACEMAKER: ICD-10-CM

## 2022-09-01 PROCEDURE — 72195 MRI PELVIS W/O DYE: CPT | Mod: 26,MH

## 2022-09-01 PROCEDURE — 71046 X-RAY EXAM CHEST 2 VIEWS: CPT | Mod: 26

## 2022-09-01 PROCEDURE — 72195 MRI PELVIS W/O DYE: CPT | Mod: MH

## 2022-09-01 PROCEDURE — 71046 X-RAY EXAM CHEST 2 VIEWS: CPT

## 2022-09-01 PROCEDURE — 93286 PERI-PX EVAL PM/LDLS PM IP: CPT | Mod: 26,76

## 2022-09-02 DIAGNOSIS — Z95.0 PRESENCE OF CARDIAC PACEMAKER: ICD-10-CM

## 2022-09-07 ENCOUNTER — APPOINTMENT (OUTPATIENT)
Dept: CARDIOLOGY | Facility: CLINIC | Age: 82
End: 2022-09-07

## 2022-09-07 DIAGNOSIS — I50.9 HEART FAILURE, UNSPECIFIED: ICD-10-CM

## 2022-09-07 LAB
INR PPP: 2.8 RATIO
INR PPP: NORMAL RATIO
QUALITY CONTROL: YES
QUALITY CONTROL: YES

## 2022-09-07 PROCEDURE — 93793 ANTICOAG MGMT PT WARFARIN: CPT

## 2022-09-14 DIAGNOSIS — Z71.89 OTHER SPECIFIED COUNSELING: ICD-10-CM

## 2022-09-14 LAB
INR PPP: 2.9 RATIO
QUALITY CONTROL: YES

## 2022-09-21 LAB
INR PPP: 2.8 RATIO
QUALITY CONTROL: YES

## 2022-10-03 LAB
INR PPP: 2.7 RATIO
QUALITY CONTROL: YES

## 2022-10-05 ENCOUNTER — APPOINTMENT (OUTPATIENT)
Dept: CARDIOLOGY | Facility: CLINIC | Age: 82
End: 2022-10-05

## 2022-10-05 DIAGNOSIS — Z79.01 LONG TERM (CURRENT) USE OF ANTICOAGULANTS: ICD-10-CM

## 2022-10-05 LAB
INR PPP: 2.4 RATIO
QUALITY CONTROL: YES

## 2022-10-05 PROCEDURE — 93793 ANTICOAG MGMT PT WARFARIN: CPT

## 2022-10-12 LAB
INR PPP: 2.4 RATIO
QUALITY CONTROL: YES

## 2022-10-19 DIAGNOSIS — I48.20 CHRONIC ATRIAL FIBRILLATION, UNSP: ICD-10-CM

## 2022-10-19 DIAGNOSIS — Z51.81 ENCOUNTER FOR THERAPEUTIC DRUG LVL MONITORING: ICD-10-CM

## 2022-10-19 DIAGNOSIS — Z79.01 ENCOUNTER FOR THERAPEUTIC DRUG LVL MONITORING: ICD-10-CM

## 2022-10-21 LAB
INR PPP: 2.1 RATIO
QUALITY CONTROL: YES

## 2022-11-02 ENCOUNTER — APPOINTMENT (OUTPATIENT)
Dept: ORTHOPEDIC SURGERY | Facility: CLINIC | Age: 82
End: 2022-11-02

## 2022-11-18 ENCOUNTER — APPOINTMENT (OUTPATIENT)
Dept: ELECTROPHYSIOLOGY | Facility: CLINIC | Age: 82
End: 2022-11-18

## 2022-11-21 ENCOUNTER — APPOINTMENT (OUTPATIENT)
Dept: ELECTROPHYSIOLOGY | Facility: CLINIC | Age: 82
End: 2022-11-21

## 2022-11-22 ENCOUNTER — NON-APPOINTMENT (OUTPATIENT)
Age: 82
End: 2022-11-22

## 2022-11-22 PROCEDURE — 93296 REM INTERROG EVL PM/IDS: CPT

## 2022-11-22 PROCEDURE — 93294 REM INTERROG EVL PM/LDLS PM: CPT

## 2023-01-26 ENCOUNTER — EMERGENCY (EMERGENCY)
Facility: HOSPITAL | Age: 83
LOS: 0 days | Discharge: HOME | End: 2023-01-26
Attending: EMERGENCY MEDICINE
Payer: MEDICARE

## 2023-01-26 VITALS
DIASTOLIC BLOOD PRESSURE: 73 MMHG | HEART RATE: 82 BPM | OXYGEN SATURATION: 100 % | SYSTOLIC BLOOD PRESSURE: 115 MMHG | TEMPERATURE: 98 F | RESPIRATION RATE: 16 BRPM

## 2023-01-26 VITALS — WEIGHT: 149.91 LBS

## 2023-01-26 DIAGNOSIS — Z88.1 ALLERGY STATUS TO OTHER ANTIBIOTIC AGENTS STATUS: ICD-10-CM

## 2023-01-26 DIAGNOSIS — Z79.01 LONG TERM (CURRENT) USE OF ANTICOAGULANTS: ICD-10-CM

## 2023-01-26 DIAGNOSIS — M79.89 OTHER SPECIFIED SOFT TISSUE DISORDERS: ICD-10-CM

## 2023-01-26 DIAGNOSIS — Z96.641 PRESENCE OF RIGHT ARTIFICIAL HIP JOINT: ICD-10-CM

## 2023-01-26 DIAGNOSIS — Z95.0 PRESENCE OF CARDIAC PACEMAKER: ICD-10-CM

## 2023-01-26 DIAGNOSIS — R60.0 LOCALIZED EDEMA: ICD-10-CM

## 2023-01-26 DIAGNOSIS — M79.661 PAIN IN RIGHT LOWER LEG: ICD-10-CM

## 2023-01-26 DIAGNOSIS — J44.9 CHRONIC OBSTRUCTIVE PULMONARY DISEASE, UNSPECIFIED: ICD-10-CM

## 2023-01-26 DIAGNOSIS — F17.200 NICOTINE DEPENDENCE, UNSPECIFIED, UNCOMPLICATED: ICD-10-CM

## 2023-01-26 DIAGNOSIS — I50.9 HEART FAILURE, UNSPECIFIED: ICD-10-CM

## 2023-01-26 DIAGNOSIS — I48.91 UNSPECIFIED ATRIAL FIBRILLATION: ICD-10-CM

## 2023-01-26 DIAGNOSIS — Z86.73 PERSONAL HISTORY OF TRANSIENT ISCHEMIC ATTACK (TIA), AND CEREBRAL INFARCTION WITHOUT RESIDUAL DEFICITS: ICD-10-CM

## 2023-01-26 LAB
ALBUMIN SERPL ELPH-MCNC: 3.8 G/DL — SIGNIFICANT CHANGE UP (ref 3.3–5)
ALP SERPL-CCNC: 154 U/L — HIGH (ref 40–120)
ALT FLD-CCNC: 42 U/L — SIGNIFICANT CHANGE UP (ref 12–78)
ANION GAP SERPL CALC-SCNC: 4 MMOL/L — LOW (ref 5–17)
AST SERPL-CCNC: 29 U/L — SIGNIFICANT CHANGE UP (ref 15–37)
BASOPHILS # BLD AUTO: 0.04 K/UL — SIGNIFICANT CHANGE UP (ref 0–0.2)
BASOPHILS NFR BLD AUTO: 0.4 % — SIGNIFICANT CHANGE UP (ref 0–2)
BILIRUB SERPL-MCNC: 0.9 MG/DL — SIGNIFICANT CHANGE UP (ref 0.2–1.2)
BLD GP AB SCN SERPL QL: SIGNIFICANT CHANGE UP
BUN SERPL-MCNC: 23 MG/DL — SIGNIFICANT CHANGE UP (ref 7–23)
CALCIUM SERPL-MCNC: 10.4 MG/DL — HIGH (ref 8.5–10.1)
CHLORIDE SERPL-SCNC: 102 MMOL/L — SIGNIFICANT CHANGE UP (ref 96–108)
CO2 SERPL-SCNC: 25 MMOL/L — SIGNIFICANT CHANGE UP (ref 22–31)
CREAT SERPL-MCNC: 1.13 MG/DL — SIGNIFICANT CHANGE UP (ref 0.5–1.3)
EGFR: 49 ML/MIN/1.73M2 — LOW
EOSINOPHIL # BLD AUTO: 0.07 K/UL — SIGNIFICANT CHANGE UP (ref 0–0.5)
EOSINOPHIL NFR BLD AUTO: 0.8 % — SIGNIFICANT CHANGE UP (ref 0–6)
GLUCOSE SERPL-MCNC: 105 MG/DL — HIGH (ref 70–99)
HCT VFR BLD CALC: 35.9 % — SIGNIFICANT CHANGE UP (ref 34.5–45)
HGB BLD-MCNC: 11.6 G/DL — SIGNIFICANT CHANGE UP (ref 11.5–15.5)
IMM GRANULOCYTES NFR BLD AUTO: 0.3 % — SIGNIFICANT CHANGE UP (ref 0–0.9)
LYMPHOCYTES # BLD AUTO: 1.42 K/UL — SIGNIFICANT CHANGE UP (ref 1–3.3)
LYMPHOCYTES # BLD AUTO: 15.4 % — SIGNIFICANT CHANGE UP (ref 13–44)
MCHC RBC-ENTMCNC: 31.2 PG — SIGNIFICANT CHANGE UP (ref 27–34)
MCHC RBC-ENTMCNC: 32.3 GM/DL — SIGNIFICANT CHANGE UP (ref 32–36)
MCV RBC AUTO: 96.5 FL — SIGNIFICANT CHANGE UP (ref 80–100)
MONOCYTES # BLD AUTO: 0.62 K/UL — SIGNIFICANT CHANGE UP (ref 0–0.9)
MONOCYTES NFR BLD AUTO: 6.7 % — SIGNIFICANT CHANGE UP (ref 2–14)
NEUTROPHILS # BLD AUTO: 7.03 K/UL — SIGNIFICANT CHANGE UP (ref 1.8–7.4)
NEUTROPHILS NFR BLD AUTO: 76.4 % — SIGNIFICANT CHANGE UP (ref 43–77)
PLATELET # BLD AUTO: 302 K/UL — SIGNIFICANT CHANGE UP (ref 150–400)
POTASSIUM SERPL-MCNC: 4.1 MMOL/L — SIGNIFICANT CHANGE UP (ref 3.5–5.3)
POTASSIUM SERPL-SCNC: 4.1 MMOL/L — SIGNIFICANT CHANGE UP (ref 3.5–5.3)
PROT SERPL-MCNC: 7.2 GM/DL — SIGNIFICANT CHANGE UP (ref 6–8.3)
RBC # BLD: 3.72 M/UL — LOW (ref 3.8–5.2)
RBC # FLD: 14.4 % — SIGNIFICANT CHANGE UP (ref 10.3–14.5)
SODIUM SERPL-SCNC: 131 MMOL/L — LOW (ref 135–145)
WBC # BLD: 9.21 K/UL — SIGNIFICANT CHANGE UP (ref 3.8–10.5)
WBC # FLD AUTO: 9.21 K/UL — SIGNIFICANT CHANGE UP (ref 3.8–10.5)

## 2023-01-26 PROCEDURE — 99284 EMERGENCY DEPT VISIT MOD MDM: CPT | Mod: FS

## 2023-01-26 PROCEDURE — 99284 EMERGENCY DEPT VISIT MOD MDM: CPT | Mod: 25

## 2023-01-26 PROCEDURE — 86900 BLOOD TYPING SEROLOGIC ABO: CPT

## 2023-01-26 PROCEDURE — 85025 COMPLETE CBC W/AUTO DIFF WBC: CPT

## 2023-01-26 PROCEDURE — 86901 BLOOD TYPING SEROLOGIC RH(D): CPT

## 2023-01-26 PROCEDURE — 93971 EXTREMITY STUDY: CPT | Mod: 26,RT

## 2023-01-26 PROCEDURE — 86850 RBC ANTIBODY SCREEN: CPT

## 2023-01-26 PROCEDURE — 36415 COLL VENOUS BLD VENIPUNCTURE: CPT

## 2023-01-26 PROCEDURE — 80053 COMPREHEN METABOLIC PANEL: CPT

## 2023-01-26 PROCEDURE — 93971 EXTREMITY STUDY: CPT | Mod: RT

## 2023-01-26 NOTE — ED ADULT NURSE NOTE - OBJECTIVE STATEMENT
pt presents to the ED for worsening right leg swelling and burning . pt had right hip replacement 2 weeks ago. denies cp, sob. No other complaints at this time.

## 2023-01-26 NOTE — ED STATDOCS - PHYSICAL EXAMINATION
Constitutional: NAD AAOx3  Eyes: PERRLA EOMI  Head: Normocephalic atraumatic  Mouth: MMM  Cardiac: regular rate   Resp: Lungs CTAB  GI: Abd s/nt/nd, no rebound or guarding.  MSK: RLE with 1+ pitting edema, NVI, chronic venous stasis  Neuro: awake, alert, moving all extremities, cranial nerves 2-12 intact, sensation intact, no dysmetria.  Skin: No rashes

## 2023-01-26 NOTE — ED STATDOCS - PATIENT PORTAL LINK FT
You can access the FollowMyHealth Patient Portal offered by [facility] by registering at the following website: [email or web address]. By joining [facility]’s FollowMyHealth portal, you will also be able to view your health information using other applications (apps) compatible with our system.

## 2023-01-26 NOTE — ED ADULT NURSE NOTE - CHIEF COMPLAINT QUOTE
Glioblastoma sp R TKR 2 weeks ago, p/w R leg swelling, erythema and pain. pt. is on Eliquis since sx.

## 2023-01-26 NOTE — ED STATDOCS - NSFOLLOWUPINSTRUCTIONS_ED_ALL_ED_FT
Follow up with your surgeon for further evaluation.   Elevate the leg.  Use compression stockings    Return to the ER for any new or other concerns.           Peripheral Edema    A person's legs and feet. One leg is normal and the other leg is affected by edema.   Peripheral edema is swelling that is caused by a buildup of fluid. Peripheral edema most often affects the lower legs, ankles, and feet. It can also develop in the arms, hands, and face. The area of the body that has peripheral edema will look swollen. It may also feel heavy or warm. Your clothes may start to feel tight. Pressing on the area may make a temporary dent in your skin (pitting edema). You may not be able to move your swollen arm or leg as much as usual.    There are many causes of peripheral edema. It can happen because of a complication of other conditions such as heart failure, kidney disease, or a problem with your circulation. It also can be a side effect of certain medicines or happen because of an infection. It often happens to women during pregnancy. Sometimes, the cause is not known.      Follow these instructions at home:      Managing pain, stiffness, and swelling   Compression stockings on a person's lower legs.   •Raise (elevate) your legs while you are sitting or lying down.      •Move around often to prevent stiffness and to reduce swelling.      • Do not sit or stand for long periods of time.      • Do not wear tight clothing. Do not wear garters on your upper legs.      •Exercise your legs to get your circulation going. This helps to move the fluid back into your blood vessels, and it may help the swelling go down.      •Wear compression stockings as told by your health care provider. These stockings help to prevent blood clots and reduce swelling in your legs. It is important that these are the correct size. These stockings should be prescribed by your doctor to prevent possible injuries.      •If elastic bandages or wraps are recommended, use them as told by your health care provider.      Medicines     •Take over-the-counter and prescription medicines only as told by your health care provider.      •Your health care provider may prescribe medicine to help your body get rid of excess water (diuretic). Take this medicine if you are told to take it.      General instructions     •Eat a low-salt (low-sodium) diet as told by your health care provider. Sometimes, eating less salt may reduce swelling.      •Pay attention to any changes in your symptoms.      •Moisturize your skin daily to help prevent skin from cracking and draining.      •Keep all follow-up visits. This is important.        Contact a health care provider if:    •You have a fever.      •You have swelling in only one leg.      •You have increased swelling, redness, or pain in one or both of your legs.      •You have drainage or sores at the area where you have edema.        Get help right away if:    •You have edema that starts suddenly or is getting worse, especially if you are pregnant or have a medical condition.      •You develop shortness of breath, especially when you are lying down.      •You have pain in your chest or abdomen.      •You feel weak.      •You feel like you will faint.      These symptoms may be an emergency. Get help right away. Call 911.   • Do not wait to see if the symptoms will go away.       • Do not drive yourself to the hospital.         Summary    •Peripheral edema is swelling that is caused by a buildup of fluid. Peripheral edema most often affects the lower legs, ankles, and feet.      •Move around often to prevent stiffness and to reduce swelling. Do not sit or stand for long periods of time.      •Pay attention to any changes in your symptoms.      •Contact a health care provider if you have edema that starts suddenly or is getting worse, especially if you are pregnant or have a medical condition.      •Get help right away if you develop shortness of breath, especially when lying down.

## 2023-01-26 NOTE — ED STATDOCS - NS ED ATTENDING STATEMENT MOD
This was a shared visit with the CHRISTY. I reviewed and verified the documentation and independently performed the documented:

## 2023-01-26 NOTE — ED ADULT NURSE NOTE - NSFALLRSKASSESSDT_ED_ALL_ED
From: Anabel Rodriguez  To: Valerie May MD  Sent: 6/6/2017 1:08 PM CDT  Subject: anxiety medicine    [facility], I just saw Dr. May not that long ago to discuss my new anxiety medicine. It seem to be doing the job until lately.  I am feeling anxious, irritable and agitated. I am wondering if the old medicine is finally out of my system and only getting the new medicine in my system. Can i take 2 pills or should I get a different dosage prescribed to me.  Thank you,  Lesa Rodriguez   26-Jan-2023 15:34

## 2023-01-26 NOTE — ED STATDOCS - OBJECTIVE STATEMENT
83 y/o female with PMHx of Afib, stroke, CHF, COPD, pacemaker presents to the ED c/o R leg swelling, redness and pain s/p R hip replacement that was done two weeks ago. Pt on Eliquis and water pills. Denies chest pain and SOB. States her R leg is warm to the touch. Pt is not a smoker.

## 2023-01-26 NOTE — ED STATDOCS - CLINICAL SUMMARY MEDICAL DECISION MAKING FREE TEXT BOX
Elderly female presents with right leg swelling s/p R hip replacement. Will r/o DVT, cellulitis and reassess closely.

## 2023-01-26 NOTE — ED STATDOCS - DISCHARGE DATE
OFFICE VISIT      Patient: Adriel Kerns   : 1953 MRN: [id number]    SUBJECTIVE:  Chief Complaint   Patient presents with   • Ear Problem     right ear buzzing x 3 days   • Foot     left foot step on nail went 3 days ago   • Knee Pain     left inner aspect acute x 1 month       A 68 year old male is here for multiple issues.    Patient has given consent to record this visit for documentation in their clinical record.    HISTORY OF PRESENT ILLNESS:    Acute dysfunction of right eustachian tube; Impacted cerumen of right ear:  Reports difficulty hearing after he heard a popping in his tight ear during a bowel movement 3-4 days ago. No discharge or bleeding through ear. Has buzzing in the right ear.   Has sinus infections which are intermittent. Denies taking any medications.     Need for vaccination:  Due for Tetanus vaccine.     Puncture wound of lesser toe of right foot without foreign body without damage to nail, initial encounter:  Reports wound on the right foot after stepping on the nail. He had stepped on a nail with shoes on his right foot 3 days ago. The nail was not too long and just touched the foot through the shoes. No tenderness on the spot where the nail hit. He applied band aid on it, but it did not stay.  The nail was clean and no rust.       MEDICATIONS:  Current Outpatient Medications   Medication Sig   • metformin (GLUCOPHAGE) 1000 MG tablet TAKE 1 TABLET BY MOUTH  TWICE DAILY WITH MEALS   • pioglitazone (ACTOS) 30 MG tablet TAKE 1 TABLET BY MOUTH  DAILY   • hydrochlorothiazide (HYDRODIURIL) 25 MG tablet TAKE 1 TABLET BY MOUTH  DAILY   • lisinopril (ZESTRIL) 5 MG tablet TAKE 1 TABLET BY MOUTH  DAILY   • pantoprazole (PROTONIX) 40 MG tablet TAKE 1 TABLET BY MOUTH  DAILY   • albuterol 108 (90 Base) MCG/ACT inhaler Inhale 2 puffs into the lungs as needed for Shortness of Breath or Wheezing.   • diclofenac (VOLTAREN) 75 MG EC tablet Take 1 tablet by mouth 2 times daily.   • gabapentin  (NEURONTIN) 800 MG tablet TAKE 1 TABLET BY MOUTH 3  TIMES DAILY ALONG WITH  100MG CAPSULE FOR A TOTAL   MG THREE TIMES DAILY   • tamsulosin (FLOMAX) 0.4 MG Cap TAKE 2 CAPSULES BY MOUTH  DAILY AFTER A MEAL   • glimepiride (AMARYL) 4 MG tablet TAKE ONE-HALF TABLET BY  MOUTH IN THE MORNING AND 1  TABLET IN THE EVENING   • atorvastatin (LIPITOR) 20 MG tablet TAKE 1 TABLET BY MOUTH  DAILY FOR CHOLESTEROL   • gabapentin (NEURONTIN) 100 MG capsule TAKE 1 CAPSULE BY MOUTH 3  TIMES DAILY   • fluticasone (FLONASE) 50 MCG/ACT nasal spray as needed.   • ketotifen (ZADITOR) 0.025 % ophthalmic solution Place into both eyes daily.   • naproxen sodium (ALEVE) 220 MG tablet 220 mg as needed.   • diclofenac (VOLTAREN) 1 % gel Apply 4 gr to joint 4 times daily Max 32 gr/day   • Cinnamon 500 MG Cap Take by mouth daily.    • latanoprost (XALATAN) 0.005 % ophthalmic solution INT ONE GTT IN BOTH EYES QPM. REFRIGERATE SPARE BOTTLES   • blood glucose (ONE TOUCH ULTRA TEST) test strip Testing once daily for  Diabetes E11.9   • Multiple Vitamins-Minerals (MULTIVITAMIN ADULT PO) Take by mouth daily.   • Pyridoxine HCl (VITAMIN B-6) 100 MG tablet Take 100 mg by mouth daily.   • cyanocobalamin (VITAMIN B-12) 100 MCG tablet Take 50 mcg by mouth daily.   • lidocaine-prilocaine (EMLA) cream Apply generous amount over port 1 hour before use.   • loratadine (CLARITIN) 10 MG tablet Take 1 tablet by mouth daily.     No current facility-administered medications for this visit.     ALLERGIES:  ALLERGIES:  No Known Allergies      Review Of Systems:  Constitutional: Per HPI.  HENT: Per HPI.  Musculoskeletal: Per HPI.  Skin: Per HPI.        OBJECTIVE:  Vitals:    12/24/21 1004   BP: 110/70   Pulse: 68   Weight: (!) 147.9 kg (326 lb 1.6 oz)   Height: 6' 1\" (1.854 m)       Body mass index is 43.02 kg/m².    Physical Exam    Constitutional: All vital signs are stable. Pleasant, alert, oriented to person, place, time, and date. Appears stated age. No  apparent distress. Well developed, well nourished, well groomed.  Nares: Thin clear mucus. Sinuses runny and congested.   Ears: Cerumen impaction in right ear. Mild effusion posterior in right ear after cerumen removal with Q-tip. Normal left ear.   Oropharynx: Clear without oropharyngeal lesions. Good dentition. Tongue midline. No perioral edema.  Neck: Supple. No bruits, JVD, tenderness, lymphadenopathy, or thyromegaly. Normal range of motion.  Lymphatic: No cervical adenopathy.  Respiratory/Lungs: Clear to auscultation throughout. Good air exchange. No wheezing, rales, or rhonchi.  Heart: Normal S1, S2. Regular rate and rhythm. No murmur, rubs, or gallops.    ASSESSMENT AND PLAN:  This is a 68 year old male who presents with :  1. Acute dysfunction of right eustachian tube    2. Need for vaccination    3. Impacted cerumen of right ear    4. Puncture wound of lesser toe of right foot without foreign body without damage to nail, initial encounter        Orders Placed This Encounter   • Td Toxoid Adsorbed Pres Free Vacc, 7+ yrs (DECAVAC/TENIVAC)   • loratadine (CLARITIN) 10 MG tablet         Plan:    Acute dysfunction of right eustachian tube:  Prescribed Claritin 10 mg to be taken as directed; mode of action, benefits and side effects explained.  Informed it may take a couple of weeks for effusion repair.   Will refer to audiologist if hearing is not improved.     Need for vaccination;  Administered TD Toxoid vaccine in the office today.     Impacted cerumen of right ear:  Cerumen was successfully disimpacted from right ear with Q-tip  Patient tolerated the procedure well.    Puncture wound of lesser toe of right foot without foreign body without damage to nail, initial encounter:  Continue to monitor the foot daily with a mirror.   Advised to inform if has erythema or edema. Advised to visit the ER.      Refer to orders.  Medical compliance with plan discussed and risks of non-compliance reviewed.  Patient  education completed on disease process, etiology & prognosis.  Proper usage and side effects of medications reviewed & discussed.  Patient understands and agrees with the plan.  Return to clinic as clinically indicated as discussed with patient who verbalized understanding of the plan and is in agreement with the plan.    I,  Dr. Nelly Berry, have created a visit summary document based on the audio recording between Dr. Rivera Holden DO and this patient for the physician to review, edit as needed, and authenticate.  Creation Date: 12/27/2021   I have reviewed and edited the visit summary above and attest that it is accurate.     26-Jan-2023

## 2023-01-26 NOTE — ED STATDOCS - ATTENDING APP SHARED VISIT CONTRIBUTION OF CARE
I, Taylor Contreras MD, performed the initial face to face bedside interview with this patient regarding history of present illness, review of symptoms and relevant past medical, social and family history.  I completed an independent physical examination.  I was the initial provider who evaluated this patient. I have signed out the follow up of any pending tests (i.e. labs, radiological studies) to the ACP.  I have communicated the patient’s plan of care and disposition with the ACP.  The history, relevant review of systems, past medical and surgical history, medical decision making, and physical examination was documented by the scribe in my presence and I attest to the accuracy of the documentation.

## 2023-01-26 NOTE — ED STATDOCS - NS ED ROS FT
Constitutional: No fever or chills  Eyes: No visual changes  HEENT: No throat pain  CV: No chest pain  Resp: No SOB no cough  GI: No abd pain, nausea or vomiting  : No dysuria  MSK: No musculoskeletal pain, +R leg swelling, +R leg erythematous  Skin: No rash  Neuro: No headache

## 2023-01-26 NOTE — ED STATDOCS - PROGRESS NOTE DETAILS
81 yo female with a PMH of a fib on eliquis, PPM, copd, chf, recent RHR sx at [facility] on 1/11/23 presents with RLE swelling and redness. Pt noticed it after the surgery which progressively got worse despite restarting her eliquis and taking her 2 water pills for chf. The PT that comes in for her noticed the swelling and informed her to go to the ER. Pt went to urgent care instead and they referred her to the ER. Will obtain labs, sono and reeval. -Jose Meza PA-C Labs and sono unremarkable. Discussed with pt. Advised to elevate leg and use compression stocking for the RLE to help with swelling. Pt aware and will call his surgeon to f/u. -Jose Meza PA-C

## 2023-01-26 NOTE — ED ADULT NURSE NOTE - NSIMPLEMENTINTERV_GEN_ALL_ED
Implemented All Fall Risk Interventions:  [place] to call system. Call bell, personal items and telephone within reach. Instruct patient to call for assistance. Room bathroom lighting operational. Non-slip footwear when patient is off stretcher. Physically safe environment: no spills, clutter or unnecessary equipment. Stretcher in lowest position, wheels locked, appropriate side rails in place. Provide visual cue, wrist band, yellow gown, etc. Monitor gait and stability. Monitor for mental status changes and reorient to person, place, and time. Review medications for side effects contributing to fall risk. Reinforce activity limits and safety measures with patient and family.

## 2023-02-14 ENCOUNTER — FORM ENCOUNTER (OUTPATIENT)
Age: 83
End: 2023-02-14

## 2023-02-21 ENCOUNTER — APPOINTMENT (OUTPATIENT)
Dept: ELECTROPHYSIOLOGY | Facility: CLINIC | Age: 83
End: 2023-02-21
Payer: MEDICARE

## 2023-02-22 ENCOUNTER — NON-APPOINTMENT (OUTPATIENT)
Age: 83
End: 2023-02-22

## 2023-02-22 PROCEDURE — 93294 REM INTERROG EVL PM/LDLS PM: CPT

## 2023-02-22 PROCEDURE — 93296 REM INTERROG EVL PM/IDS: CPT

## 2023-04-25 ENCOUNTER — FORM ENCOUNTER (OUTPATIENT)
Age: 83
End: 2023-04-25

## 2023-05-23 ENCOUNTER — APPOINTMENT (OUTPATIENT)
Dept: ELECTROPHYSIOLOGY | Facility: CLINIC | Age: 83
End: 2023-05-23
Payer: MEDICARE

## 2023-05-24 ENCOUNTER — NON-APPOINTMENT (OUTPATIENT)
Age: 83
End: 2023-05-24

## 2023-05-24 PROCEDURE — 93296 REM INTERROG EVL PM/IDS: CPT

## 2023-05-24 PROCEDURE — 93294 REM INTERROG EVL PM/LDLS PM: CPT

## 2023-08-22 ENCOUNTER — APPOINTMENT (OUTPATIENT)
Dept: ELECTROPHYSIOLOGY | Facility: CLINIC | Age: 83
End: 2023-08-22
Payer: MEDICARE

## 2023-08-23 ENCOUNTER — NON-APPOINTMENT (OUTPATIENT)
Age: 83
End: 2023-08-23

## 2023-08-23 PROCEDURE — 93296 REM INTERROG EVL PM/IDS: CPT

## 2023-08-23 PROCEDURE — 93294 REM INTERROG EVL PM/LDLS PM: CPT

## 2023-11-21 ENCOUNTER — APPOINTMENT (OUTPATIENT)
Dept: ELECTROPHYSIOLOGY | Facility: CLINIC | Age: 83
End: 2023-11-21
Payer: MEDICARE

## 2023-11-22 ENCOUNTER — NON-APPOINTMENT (OUTPATIENT)
Age: 83
End: 2023-11-22

## 2023-11-22 PROCEDURE — 93296 REM INTERROG EVL PM/IDS: CPT

## 2023-11-22 PROCEDURE — 93294 REM INTERROG EVL PM/LDLS PM: CPT

## 2024-02-21 ENCOUNTER — APPOINTMENT (OUTPATIENT)
Dept: ELECTROPHYSIOLOGY | Facility: CLINIC | Age: 84
End: 2024-02-21
Payer: MEDICARE

## 2024-02-21 ENCOUNTER — NON-APPOINTMENT (OUTPATIENT)
Age: 84
End: 2024-02-21

## 2024-02-21 PROCEDURE — 93296 REM INTERROG EVL PM/IDS: CPT

## 2024-02-21 PROCEDURE — 93294 REM INTERROG EVL PM/LDLS PM: CPT

## 2024-05-22 ENCOUNTER — NON-APPOINTMENT (OUTPATIENT)
Age: 84
End: 2024-05-22

## 2024-05-22 ENCOUNTER — APPOINTMENT (OUTPATIENT)
Dept: ELECTROPHYSIOLOGY | Facility: CLINIC | Age: 84
End: 2024-05-22
Payer: MEDICARE

## 2024-05-22 PROCEDURE — 93294 REM INTERROG EVL PM/LDLS PM: CPT

## 2024-05-22 PROCEDURE — 93296 REM INTERROG EVL PM/IDS: CPT

## 2024-08-20 ENCOUNTER — APPOINTMENT (OUTPATIENT)
Dept: ELECTROPHYSIOLOGY | Facility: CLINIC | Age: 84
End: 2024-08-20
Payer: MEDICARE

## 2024-08-21 ENCOUNTER — NON-APPOINTMENT (OUTPATIENT)
Age: 84
End: 2024-08-21

## 2024-08-21 PROCEDURE — 93294 REM INTERROG EVL PM/LDLS PM: CPT

## 2024-08-21 PROCEDURE — 93296 REM INTERROG EVL PM/IDS: CPT

## 2024-08-21 RX ORDER — APIXABAN 5 MG/1
5 TABLET, FILM COATED ORAL
Qty: 60 | Refills: 3 | Status: ACTIVE | COMMUNITY
Start: 2024-08-21

## 2024-09-18 ENCOUNTER — RESULT REVIEW (OUTPATIENT)
Age: 84
End: 2024-09-18

## 2024-09-18 ENCOUNTER — OUTPATIENT (OUTPATIENT)
Dept: OUTPATIENT SERVICES | Facility: HOSPITAL | Age: 84
LOS: 1 days | End: 2024-09-18
Payer: MEDICARE

## 2024-09-18 VITALS
RESPIRATION RATE: 15 BRPM | HEART RATE: 73 BPM | DIASTOLIC BLOOD PRESSURE: 76 MMHG | TEMPERATURE: 98 F | SYSTOLIC BLOOD PRESSURE: 123 MMHG | HEIGHT: 67 IN | WEIGHT: 145.51 LBS | OXYGEN SATURATION: 99 %

## 2024-09-18 DIAGNOSIS — Z98.890 OTHER SPECIFIED POSTPROCEDURAL STATES: Chronic | ICD-10-CM

## 2024-09-18 DIAGNOSIS — Z95.0 PRESENCE OF CARDIAC PACEMAKER: ICD-10-CM

## 2024-09-18 DIAGNOSIS — Z90.722 ACQUIRED ABSENCE OF OVARIES, BILATERAL: Chronic | ICD-10-CM

## 2024-09-18 DIAGNOSIS — Z98.49 CATARACT EXTRACTION STATUS, UNSPECIFIED EYE: Chronic | ICD-10-CM

## 2024-09-18 DIAGNOSIS — Z95.0 PRESENCE OF CARDIAC PACEMAKER: Chronic | ICD-10-CM

## 2024-09-18 LAB
A1C WITH ESTIMATED AVERAGE GLUCOSE RESULT: 6.2 % — HIGH (ref 4–5.6)
ANION GAP SERPL CALC-SCNC: 5 MMOL/L — SIGNIFICANT CHANGE UP (ref 5–17)
APTT BLD: 36.6 SEC — HIGH (ref 24.5–35.6)
BASOPHILS # BLD AUTO: 0.03 K/UL — SIGNIFICANT CHANGE UP (ref 0–0.2)
BASOPHILS NFR BLD AUTO: 0.5 % — SIGNIFICANT CHANGE UP (ref 0–2)
BLD GP AB SCN SERPL QL: SIGNIFICANT CHANGE UP
BUN SERPL-MCNC: 27 MG/DL — HIGH (ref 7–23)
CALCIUM SERPL-MCNC: 10.2 MG/DL — HIGH (ref 8.5–10.1)
CHLORIDE SERPL-SCNC: 107 MMOL/L — SIGNIFICANT CHANGE UP (ref 96–108)
CO2 SERPL-SCNC: 25 MMOL/L — SIGNIFICANT CHANGE UP (ref 22–31)
CREAT SERPL-MCNC: 1.1 MG/DL — SIGNIFICANT CHANGE UP (ref 0.5–1.3)
EGFR: 50 ML/MIN/1.73M2 — LOW
EOSINOPHIL # BLD AUTO: 0.13 K/UL — SIGNIFICANT CHANGE UP (ref 0–0.5)
EOSINOPHIL NFR BLD AUTO: 2 % — SIGNIFICANT CHANGE UP (ref 0–6)
ESTIMATED AVERAGE GLUCOSE: 131 MG/DL — HIGH (ref 68–114)
GLUCOSE SERPL-MCNC: 93 MG/DL — SIGNIFICANT CHANGE UP (ref 70–99)
HCT VFR BLD CALC: 41.6 % — SIGNIFICANT CHANGE UP (ref 34.5–45)
HGB BLD-MCNC: 13.5 G/DL — SIGNIFICANT CHANGE UP (ref 11.5–15.5)
IMM GRANULOCYTES NFR BLD AUTO: 0.3 % — SIGNIFICANT CHANGE UP (ref 0–0.9)
INR BLD: 1.27 RATIO — HIGH (ref 0.85–1.18)
LYMPHOCYTES # BLD AUTO: 1.68 K/UL — SIGNIFICANT CHANGE UP (ref 1–3.3)
LYMPHOCYTES # BLD AUTO: 25.6 % — SIGNIFICANT CHANGE UP (ref 13–44)
MCHC RBC-ENTMCNC: 31.2 PG — SIGNIFICANT CHANGE UP (ref 27–34)
MCHC RBC-ENTMCNC: 32.5 GM/DL — SIGNIFICANT CHANGE UP (ref 32–36)
MCV RBC AUTO: 96.1 FL — SIGNIFICANT CHANGE UP (ref 80–100)
MONOCYTES # BLD AUTO: 0.58 K/UL — SIGNIFICANT CHANGE UP (ref 0–0.9)
MONOCYTES NFR BLD AUTO: 8.8 % — SIGNIFICANT CHANGE UP (ref 2–14)
MRSA PCR RESULT.: SIGNIFICANT CHANGE UP
NEUTROPHILS # BLD AUTO: 4.13 K/UL — SIGNIFICANT CHANGE UP (ref 1.8–7.4)
NEUTROPHILS NFR BLD AUTO: 62.8 % — SIGNIFICANT CHANGE UP (ref 43–77)
PLATELET # BLD AUTO: 182 K/UL — SIGNIFICANT CHANGE UP (ref 150–400)
POTASSIUM SERPL-MCNC: 4.5 MMOL/L — SIGNIFICANT CHANGE UP (ref 3.5–5.3)
POTASSIUM SERPL-SCNC: 4.5 MMOL/L — SIGNIFICANT CHANGE UP (ref 3.5–5.3)
PROTHROM AB SERPL-ACNC: 14.2 SEC — HIGH (ref 9.5–13)
RBC # BLD: 4.33 M/UL — SIGNIFICANT CHANGE UP (ref 3.8–5.2)
RBC # FLD: 14.4 % — SIGNIFICANT CHANGE UP (ref 10.3–14.5)
S AUREUS DNA NOSE QL NAA+PROBE: SIGNIFICANT CHANGE UP
SODIUM SERPL-SCNC: 137 MMOL/L — SIGNIFICANT CHANGE UP (ref 135–145)
WBC # BLD: 6.57 K/UL — SIGNIFICANT CHANGE UP (ref 3.8–10.5)
WBC # FLD AUTO: 6.57 K/UL — SIGNIFICANT CHANGE UP (ref 3.8–10.5)

## 2024-09-18 PROCEDURE — 87640 STAPH A DNA AMP PROBE: CPT

## 2024-09-18 PROCEDURE — 85610 PROTHROMBIN TIME: CPT

## 2024-09-18 PROCEDURE — 86901 BLOOD TYPING SEROLOGIC RH(D): CPT

## 2024-09-18 PROCEDURE — 86900 BLOOD TYPING SEROLOGIC ABO: CPT

## 2024-09-18 PROCEDURE — 85730 THROMBOPLASTIN TIME PARTIAL: CPT

## 2024-09-18 PROCEDURE — 71046 X-RAY EXAM CHEST 2 VIEWS: CPT | Mod: 26

## 2024-09-18 PROCEDURE — 80048 BASIC METABOLIC PNL TOTAL CA: CPT

## 2024-09-18 PROCEDURE — 85025 COMPLETE CBC W/AUTO DIFF WBC: CPT

## 2024-09-18 PROCEDURE — 93010 ELECTROCARDIOGRAM REPORT: CPT

## 2024-09-18 PROCEDURE — 93005 ELECTROCARDIOGRAM TRACING: CPT

## 2024-09-18 PROCEDURE — 71046 X-RAY EXAM CHEST 2 VIEWS: CPT

## 2024-09-18 PROCEDURE — 83036 HEMOGLOBIN GLYCOSYLATED A1C: CPT

## 2024-09-18 PROCEDURE — 87641 MR-STAPH DNA AMP PROBE: CPT

## 2024-09-18 PROCEDURE — 99214 OFFICE O/P EST MOD 30 MIN: CPT | Mod: 25

## 2024-09-18 PROCEDURE — 36415 COLL VENOUS BLD VENIPUNCTURE: CPT

## 2024-09-18 PROCEDURE — 86850 RBC ANTIBODY SCREEN: CPT

## 2024-09-18 NOTE — H&P PST ADULT - NSANTHOSAYNRD_GEN_A_CORE
No. SARI screening performed.  STOP BANG Legend: 0-2 = LOW Risk; 3-4 = INTERMEDIATE Risk; 5-8 = HIGH Risk

## 2024-09-18 NOTE — H&P PST ADULT - HISTORY OF PRESENT ILLNESS
84 year old female with PMH of Afib - on Eliquis stroke in 2006 (no residual effects), PPM present, HTN, CHF, HLD, COPD, gout, pre-diabetes; patient states she has been feeling well, no complaints presents to PST for planned PPM generator change.

## 2024-09-18 NOTE — H&P PST ADULT - NSICDXFAMILYHX_GEN_ALL_CORE_FT
FAMILY HISTORY:  Father  Still living? Unknown  FH: myocardial infarction, Age at diagnosis: Age Unknown    Mother  Still living? No  FH: lung cancer, Age at diagnosis: Age Unknown    Sibling  Still living? Yes, Estimated age: 81-90  FH: type 2 diabetes, Age at diagnosis: Age Unknown

## 2024-09-18 NOTE — H&P PST ADULT - ASSESSMENT
84 year old female presents to PST for planned PPM generator change.       1. EPS department to give written instructions to patient regarding medication management.  2. EPS booking will call patient the day before procedure regarding patient arrival time the day of surgery.  3. Instructed patient to have responsible adult to drive patient home if being discharged same day.  4. EZ wash and mupirocin instructions explained to patient.

## 2024-09-18 NOTE — H&P PST ADULT - NSICDXPASTMEDICALHX_GEN_ALL_CORE_FT
PAST MEDICAL HISTORY:  Atrial fibrillation     COPD, mild     H/O CHF     H/O peripheral neuropathy     H/O: gout     History of cluster headache     Pacemaker     Pre-diabetes     Stroke

## 2024-09-18 NOTE — H&P PST ADULT - NSICDXPASTSURGICALHX_GEN_ALL_CORE_FT
PAST SURGICAL HISTORY:  Cardiac pacemaker     History of tonsillectomy     S/P bilateral salpingo-oophorectomy     S/P cataract surgery     S/P correction of deviated nasal septum

## 2024-09-19 DIAGNOSIS — Z95.0 PRESENCE OF CARDIAC PACEMAKER: ICD-10-CM

## 2024-09-19 PROBLEM — J44.9 CHRONIC OBSTRUCTIVE PULMONARY DISEASE, UNSPECIFIED: Chronic | Status: ACTIVE | Noted: 2024-09-18

## 2024-09-19 PROBLEM — R73.03 PREDIABETES: Chronic | Status: ACTIVE | Noted: 2024-09-18

## 2024-09-19 PROBLEM — Z86.79 PERSONAL HISTORY OF OTHER DISEASES OF THE CIRCULATORY SYSTEM: Chronic | Status: ACTIVE | Noted: 2024-09-18

## 2024-09-19 PROBLEM — Z87.39 PERSONAL HISTORY OF OTHER DISEASES OF THE MUSCULOSKELETAL SYSTEM AND CONNECTIVE TISSUE: Chronic | Status: ACTIVE | Noted: 2024-09-18

## 2024-09-19 PROBLEM — Z86.69 PERSONAL HISTORY OF OTHER DISEASES OF THE NERVOUS SYSTEM AND SENSE ORGANS: Chronic | Status: ACTIVE | Noted: 2024-09-18

## 2024-09-24 ENCOUNTER — APPOINTMENT (OUTPATIENT)
Dept: ELECTROPHYSIOLOGY | Facility: CLINIC | Age: 84
End: 2024-09-24

## 2024-09-24 NOTE — ASU PATIENT PROFILE, ADULT - VISION (WITH CORRECTIVE LENSES IF THE PATIENT USUALLY WEARS THEM):
I attest my time as attending is greater than 50% of the total combined time spent on qualifying patient care activities by the PA/NP and attending.
Normal vision: sees adequately in most situations; can see medication labels, newsprint

## 2024-09-25 ENCOUNTER — TRANSCRIPTION ENCOUNTER (OUTPATIENT)
Age: 84
End: 2024-09-25

## 2024-09-25 ENCOUNTER — OUTPATIENT (OUTPATIENT)
Dept: OUTPATIENT SERVICES | Facility: HOSPITAL | Age: 84
LOS: 1 days | Discharge: HOME | End: 2024-09-25
Payer: MEDICARE

## 2024-09-25 VITALS
RESPIRATION RATE: 18 BRPM | OXYGEN SATURATION: 98 % | HEART RATE: 94 BPM | DIASTOLIC BLOOD PRESSURE: 77 MMHG | SYSTOLIC BLOOD PRESSURE: 124 MMHG

## 2024-09-25 VITALS
RESPIRATION RATE: 18 BRPM | OXYGEN SATURATION: 97 % | TEMPERATURE: 97 F | HEART RATE: 82 BPM | SYSTOLIC BLOOD PRESSURE: 136 MMHG | DIASTOLIC BLOOD PRESSURE: 80 MMHG

## 2024-09-25 DIAGNOSIS — Z90.722 ACQUIRED ABSENCE OF OVARIES, BILATERAL: Chronic | ICD-10-CM

## 2024-09-25 DIAGNOSIS — Z98.890 OTHER SPECIFIED POSTPROCEDURAL STATES: Chronic | ICD-10-CM

## 2024-09-25 DIAGNOSIS — Z95.0 PRESENCE OF CARDIAC PACEMAKER: ICD-10-CM

## 2024-09-25 DIAGNOSIS — Z95.0 PRESENCE OF CARDIAC PACEMAKER: Chronic | ICD-10-CM

## 2024-09-25 DIAGNOSIS — Z98.49 CATARACT EXTRACTION STATUS, UNSPECIFIED EYE: Chronic | ICD-10-CM

## 2024-09-25 PROCEDURE — C1785: CPT

## 2024-09-25 PROCEDURE — 93010 ELECTROCARDIOGRAM REPORT: CPT

## 2024-09-25 PROCEDURE — 93005 ELECTROCARDIOGRAM TRACING: CPT | Mod: XU

## 2024-09-25 PROCEDURE — C1889: CPT

## 2024-09-25 PROCEDURE — 33228 REMV&REPLC PM GEN DUAL LEAD: CPT | Mod: KX

## 2024-09-25 PROCEDURE — 33228 REMV&REPLC PM GEN DUAL LEAD: CPT

## 2024-09-25 RX ORDER — APIXABAN 5 MG/1
1 TABLET, FILM COATED ORAL
Refills: 0 | DISCHARGE

## 2024-09-25 RX ORDER — CEFAZOLIN SODIUM 2 G/100ML
2000 INJECTION, SOLUTION INTRAVENOUS ONCE
Refills: 0 | Status: COMPLETED | OUTPATIENT
Start: 2024-09-25 | End: 2024-09-25

## 2024-09-25 RX ORDER — VERAPAMIL HCL 180 MG
1 TABLET, EXTENDED RELEASE ORAL
Refills: 0 | DISCHARGE

## 2024-09-25 RX ORDER — CEPHALEXIN 500 MG
500 CAPSULE ORAL
Refills: 0 | Status: DISCONTINUED | OUTPATIENT
Start: 2024-09-25 | End: 2024-09-25

## 2024-09-25 RX ORDER — FUROSEMIDE 40 MG
1 TABLET ORAL
Refills: 0 | DISCHARGE

## 2024-09-25 RX ORDER — CEFAZOLIN SODIUM 2 G/100ML
2000 INJECTION, SOLUTION INTRAVENOUS ONCE
Refills: 0 | Status: DISCONTINUED | OUTPATIENT
Start: 2024-09-25 | End: 2024-09-25

## 2024-09-25 RX ORDER — SPIRONOLACTONE 25 MG/1
1 TABLET, FILM COATED ORAL
Refills: 0 | DISCHARGE

## 2024-09-25 RX ORDER — DIGOXIN 0.12 MG/1
1 TABLET ORAL
Refills: 0 | DISCHARGE

## 2024-09-25 RX ORDER — CEPHALEXIN 500 MG
1 CAPSULE ORAL
Qty: 0 | Refills: 0 | DISCHARGE
Start: 2024-09-25

## 2024-09-25 RX ORDER — CEPHALEXIN 500 MG
250 CAPSULE ORAL EVERY 6 HOURS
Refills: 0 | Status: DISCONTINUED | OUTPATIENT
Start: 2024-09-25 | End: 2024-09-25

## 2024-09-25 RX ORDER — ALLOPURINOL 300 MG
1 TABLET ORAL
Refills: 0 | DISCHARGE

## 2024-09-25 RX ADMIN — CEFAZOLIN SODIUM 2000 MILLIGRAM(S): 2 INJECTION, SOLUTION INTRAVENOUS at 09:04

## 2024-09-25 NOTE — ASU DISCHARGE PLAN (ADULT/PEDIATRIC) - CARE PROVIDER_API CALL
Lencho Davis.  Cardiac Electrophysiology  [address] [place] [address]  Phone: [phone]  Fax: [phone]  Follow Up Time:

## 2024-09-25 NOTE — PACU DISCHARGE NOTE - COMMENTS
Pt A&OX4, S/P Gen change. Pt denies any pain. Dressing to Left chest that is clean dry and intact. No S/S of bleeding. Vital signs stable. PIVL removed. All discharge paperwork reviewed with pt. Pt to follow up as directed. Patient verbalized good understanding to all utilizing teach back and is without concerns at this time in.  will drive Pt home.

## 2024-09-25 NOTE — ASU DISCHARGE PLAN (ADULT/PEDIATRIC) - NS MD DC FALL RISK RISK
For information on Fall & Injury Prevention, visit: [email or web address] OR  [email or web address] OR  https://www.cdc.gov/steadi/patient.html
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
## 2024-09-30 DIAGNOSIS — T82.111A BREAKDOWN (MECHANICAL) OF CARDIAC PULSE GENERATOR (BATTERY), INITIAL ENCOUNTER: ICD-10-CM
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
## 2024-10-08 NOTE — POST DISCHARGE NOTE - REASON FOR FOLLOW UP:
Post Generator Change September 21, 2017      Sakina Cooper DO  [address] [place] [address]           [facility] - OB/ GYN  [address]  [place] [address]  Phone: [phone]  Fax: [phone]          Patient: Dary Chaney   MR Number: [id number]   YOB: 1954   Date of Visit: 9/21/2017       Dear Dr. Sakina Cooper:    Thank you for referring Dary Chaney to me for evaluation. Attached you will find relevant portions of my assessment and plan of care.    If you have questions, please do not hesitate to call me. I look forward to following Dary Chaney along with you.    Sincerely,    Ari Barajas MD    Enclosure  CC:  No Recipients    If you would like to receive this communication electronically, please contact [email or web address] or [phone] to request more information on [facility] access.    For providers and/or their staff who would like to refer a patient to [facility], please contact us through our one-stop-shop provider referral line, [facility], at [phone].    If you feel you have received this communication in error or would no longer like to receive these types of communications, please e-mail [email or web address]

## 2024-10-08 NOTE — POST DISCHARGE NOTE - NOTIFICATION:
Post procedure phone call completed; patient understood all discharge paperwork. No questions regarding medications or pain management. MD follow up appointment made. Patient was able to rest when they were discharged. Patient will recommend [facility], no complaints of hospital stay, satisfied with care. Instructed patient to contact provider with any further questions or concerns.

## 2024-10-09 ENCOUNTER — APPOINTMENT (OUTPATIENT)
Dept: ELECTROPHYSIOLOGY | Facility: CLINIC | Age: 84
End: 2024-10-09
Payer: MEDICARE

## 2024-10-09 ENCOUNTER — NON-APPOINTMENT (OUTPATIENT)
Age: 84
End: 2024-10-09

## 2024-10-09 VITALS
HEIGHT: 67 IN | SYSTOLIC BLOOD PRESSURE: 121 MMHG | DIASTOLIC BLOOD PRESSURE: 68 MMHG | BODY MASS INDEX: 22.44 KG/M2 | WEIGHT: 143 LBS | HEART RATE: 80 BPM | OXYGEN SATURATION: 99 %

## 2024-10-09 PROCEDURE — 99024 POSTOP FOLLOW-UP VISIT: CPT

## 2024-10-09 RX ORDER — COLCHICINE 0.6 MG/1
0.6 CAPSULE ORAL AS DIRECTED
Refills: 0 | Status: ACTIVE | COMMUNITY
Start: 2024-10-09

## 2024-10-09 RX ORDER — ALLOPURINOL 200 MG/1
TABLET ORAL
Refills: 0 | Status: ACTIVE | COMMUNITY

## 2024-12-31 ENCOUNTER — NON-APPOINTMENT (OUTPATIENT)
Age: 84
End: 2024-12-31

## 2025-01-08 ENCOUNTER — APPOINTMENT (OUTPATIENT)
Dept: ELECTROPHYSIOLOGY | Facility: CLINIC | Age: 85
End: 2025-01-08
Payer: MEDICARE

## 2025-01-08 ENCOUNTER — NON-APPOINTMENT (OUTPATIENT)
Age: 85
End: 2025-01-08

## 2025-01-08 PROCEDURE — 93294 REM INTERROG EVL PM/LDLS PM: CPT

## 2025-01-08 PROCEDURE — 93296 REM INTERROG EVL PM/IDS: CPT

## 2025-04-09 ENCOUNTER — NON-APPOINTMENT (OUTPATIENT)
Age: 85
End: 2025-04-09

## 2025-04-09 ENCOUNTER — APPOINTMENT (OUTPATIENT)
Dept: ELECTROPHYSIOLOGY | Facility: CLINIC | Age: 85
End: 2025-04-09
Payer: MEDICARE

## 2025-04-09 PROCEDURE — 93294 REM INTERROG EVL PM/LDLS PM: CPT

## 2025-04-09 PROCEDURE — 93296 REM INTERROG EVL PM/IDS: CPT

## 2025-07-11 ENCOUNTER — NON-APPOINTMENT (OUTPATIENT)
Age: 85
End: 2025-07-11

## 2025-07-11 ENCOUNTER — APPOINTMENT (OUTPATIENT)
Dept: ELECTROPHYSIOLOGY | Facility: CLINIC | Age: 85
End: 2025-07-11
Payer: MEDICARE

## 2025-07-11 PROCEDURE — 93294 REM INTERROG EVL PM/LDLS PM: CPT

## 2025-07-11 PROCEDURE — 93296 REM INTERROG EVL PM/IDS: CPT
